# Patient Record
Sex: MALE | Race: WHITE | URBAN - METROPOLITAN AREA
[De-identification: names, ages, dates, MRNs, and addresses within clinical notes are randomized per-mention and may not be internally consistent; named-entity substitution may affect disease eponyms.]

---

## 2020-07-01 LAB
ANTIBODY: NONREACTIVE
PROSTATE SPECIFIC ANTIGEN: 0.1 NG/ML

## 2020-08-03 PROBLEM — Z85.46 HISTORY OF PROSTATE CANCER: Status: ACTIVE | Noted: 2020-08-03

## 2020-08-03 PROBLEM — N52.31 ERECTILE DYSFUNCTION AFTER RADICAL PROSTATECTOMY: Status: ACTIVE | Noted: 2020-08-03

## 2020-08-20 ENCOUNTER — OFFICE VISIT (OUTPATIENT)
Dept: ORTHOPEDIC SURGERY | Age: 70
End: 2020-08-20
Payer: MEDICARE

## 2020-08-20 VITALS
DIASTOLIC BLOOD PRESSURE: 72 MMHG | HEART RATE: 60 BPM | SYSTOLIC BLOOD PRESSURE: 122 MMHG | HEIGHT: 72 IN | WEIGHT: 178.8 LBS | BODY MASS INDEX: 24.22 KG/M2

## 2020-08-20 PROCEDURE — G8427 DOCREV CUR MEDS BY ELIG CLIN: HCPCS | Performed by: ORTHOPAEDIC SURGERY

## 2020-08-20 PROCEDURE — 4040F PNEUMOC VAC/ADMIN/RCVD: CPT | Performed by: ORTHOPAEDIC SURGERY

## 2020-08-20 PROCEDURE — 3017F COLORECTAL CA SCREEN DOC REV: CPT | Performed by: ORTHOPAEDIC SURGERY

## 2020-08-20 PROCEDURE — G8420 CALC BMI NORM PARAMETERS: HCPCS | Performed by: ORTHOPAEDIC SURGERY

## 2020-08-20 PROCEDURE — 99203 OFFICE O/P NEW LOW 30 MIN: CPT | Performed by: ORTHOPAEDIC SURGERY

## 2020-08-20 PROCEDURE — 1123F ACP DISCUSS/DSCN MKR DOCD: CPT | Performed by: ORTHOPAEDIC SURGERY

## 2020-08-20 PROCEDURE — 1036F TOBACCO NON-USER: CPT | Performed by: ORTHOPAEDIC SURGERY

## 2020-08-20 ASSESSMENT — ENCOUNTER SYMPTOMS
CONSTIPATION: 0
SHORTNESS OF BREATH: 0
NAUSEA: 0
APNEA: 0
ABDOMINAL DISTENTION: 0
ABDOMINAL PAIN: 0
DIARRHEA: 0
COLOR CHANGE: 0
CHEST TIGHTNESS: 0
COUGH: 0
VOMITING: 0

## 2020-08-20 NOTE — PROGRESS NOTES
None   Relationships    Social connections     Talks on phone: None     Gets together: None     Attends Jehovah's witness service: None     Active member of club or organization: None     Attends meetings of clubs or organizations: None     Relationship status: None    Intimate partner violence     Fear of current or ex partner: None     Emotionally abused: None     Physically abused: None     Forced sexual activity: None   Other Topics Concern    None   Social History Narrative    None       Family History:  No family history on file. Vitals:   /72   Pulse 60   Ht 6' (1.829 m)   Wt 178 lb 12.8 oz (81.1 kg)   BMI 24.25 kg/m²  Body mass index is 24.25 kg/m². Physical Examination:     Orthopedics:    GENERAL: Alert and oriented X3 in no acute distress. SKIN: Intact without lesions or ulcerations. NEURO: Intact to sensory and motor testing. VASC: Capillary refill is less than 3 seconds. Left Hip     GEN: Alert and oriented X 3, in no acute distress. GAIT: The patient's gait was observed while entering the exam room and was noted to be antalgic. The extremity is in anatomic alignment. SKIN: Intact without rashes, lesions, or ulcerations. No obvious deformity or swelling. NEURO: The patient responds to light touch throughout left LE. Patellar and Achilles reflexes are 2/4. VASC: The left LE is neurovascularly intact with 2/4 DP and 2/4 PT pulses. Brisk capillary refill. ROM: 0 degree flexion contracture, 90 degrees flexion, 20 degrees abduction, 10 degrees adduction, -10 degrees of internal rotation, 30 degrees of external rotation. MUSC: Strength is 4+/5 flexion, abduction, internal rotation, external rotation. PALP: The patient is  tender to palpation over the greater trochanter. TEST: Log roll is positive. 1.5 cm shorter with the left leg compared to the contralateral side. (-) Stenchfield test. (+) Impingement test. Negative Trendelenburg test. Negative Bryan's test. (-) C sign.  No labral clunks. No pain on compression. The patient is able to SLR. Pain with FADIR. Assessment:     1. Primary osteoarthritis of left hip    2. Greater trochanteric bursitis of left hip      Procedures:    Procedure: no  Radiology:   X-rays taken today were reviewed with the patient. HIP/PELVIS X-RAY    AP and standing AP of the pelvis and supine AP and frog leg lateral of the Left hip Radiographs were obtained today and demonstrate joint spacing is obliterated with bone on bone articulation with femoral head and acetabular cysts, sclerosis, and osteophytes. There is no evidence of fracture, dislocation or other significant abnormality. Lumbar spine degenerative disease is noticed. Moderate right hip arthritis noted. Impression: Severe left hip osteoarthritis. Plan:   Treatment : I reviewed the X-ray with the patient. We discussed the etiologies and natural histories of greater trochanteric bursitis of the left hip. We discussed the various treatment alternatives including anti-inflammatory medications, physical therapy, injections, further imaging studies and as a last result surgery. During today's visit, I explained to the patient that his x-rays taken today show that he has bone on bone arthritis of the left hip. I also explained that I do believe he has some bursitis pain present on the lateral aspect of his hip, but the majority of his pain is probably coming from the hip joint itself. I discussed with the patient that physical therapy will be a double edged sword because it can certainly aggravate his arthritis more. He can continue to take his Ibuprofen at a prescription dose to not only help with the pain but the inflammation as well. A NSAID sheet protocol was given to the patient regarding how to take OTC NSAIDs at a prescription dose. If the patient is tired of taking 4 pills three times a day then we can prescribe him Mobic to take once a day.  Unfortunately, I explained to the patient that the only thing that is going to fix his arthritis is with a total hip replacement. He will know when he is ready for a hip replacement when his quality of life is decreased and he is not able to play golf or tennis. The patient has opted for taking Ibuprofen at a prescription dose to help decrease the inflammation and doing research on hip replacements. A hip replacement guide was given to the patient. A physical therapy prescription was not given. Patient should return to the clinic PRN to follow up with Kvng Romo D.O. . The patient will call the office immediately with any problems. No orders of the defined types were placed in this encounter. Orders Placed This Encounter   Procedures    XR HIP LEFT MIN 4VW W PELVIS     Standing Status:   Future     Number of Occurrences:   1     Standing Expiration Date:   8/20/2021     Order Specific Question:   Reason for exam:     Answer:   pain       I, Sabino Juarez MS, AT, ATC, am scribing for and in the presence of Kvng HOWARD. 8/20/2020  7:23 AM        Electronically signed by Sabino Juarez ATC, on 8/20/2020 at 7:23 AM             I, Kvng Romo DO, have personally seen this patient and I have reviewed the CC, PMH, FHX and Social History as provided by other clinical staff. I reassessed the HPI and ROS as scribed by Sabino Juarez ATC in my presence and it is both accurate and complete. Thereafter, I personally performed the PE, reviewed the imaging and established the DX and POC. I agree with the documentation provided by the . I have reviewed all documentation in its entirety prior to providing my signature indicating agreement. Any areas of disagreement are noted on the chart.     Electronically signed by Génesis Reaves DO on 8/23/2020 at 3:49 PM

## 2020-08-20 NOTE — PATIENT INSTRUCTIONS
Patient Education        Hip Arthritis: Exercises  Introduction  Here are some examples of exercises for you to try. The exercises may be suggested for a condition or for rehabilitation. Start each exercise slowly. Ease off the exercises if you start to have pain. You will be told when to start these exercises and which ones will work best for you. How to do the exercises  Straight-leg raises to the outside   1. Lie on your side, with your affected hip on top. 2. Tighten the front thigh muscles of your top leg to keep your knee straight. 3. Keep your hip and your leg straight in line with the rest of your body, and keep your knee pointing forward. Do not drop your hip back. 4. Lift your top leg straight up toward the ceiling, about 12 inches off the floor. Hold for about 6 seconds, then slowly lower your leg. 5. Repeat 8 to 12 times. 6. Switch legs and repeat steps 1 through 5, even if only one hip is sore. Straight-leg raises to the inside   1. Lie on your side with your affected hip on the floor. 2. You can either prop up your other leg on a chair, or you can bend that knee and put that foot in front of your other knee. Do not drop your hip back. 3. Tighten the muscles on the front thigh of your bottom leg to straighten that knee. 4. Keep your kneecap pointing forward and your leg straight, and lift your bottom leg up toward the ceiling about 6 inches. Hold for about 6 seconds, then lower slowly. 5. Repeat 8 to 12 times. 6. Switch legs and repeat steps 1 through 5, even if only one hip is sore. Hip hike   1. Stand sideways on the bottom step of a staircase, and hold on to the banister or wall. 2. Keeping both knees straight, lift your good leg off the step and let it hang down. Then hike your good hip up to the same level as your affected hip or a little higher. 3. Repeat 8 to 12 times. 4. Switch legs and repeat steps 1 through 3, even if only one hip is sore. Bridging   1.  Lie on your back with both knees bent. Your knees should be bent about 90 degrees. 2. Then push your feet into the floor, squeeze your buttocks, and lift your hips off the floor until your shoulders, hips, and knees are all in a straight line. 3. Hold for about 6 seconds as you continue to breathe normally, and then slowly lower your hips back down to the floor and rest for up to 10 seconds. 4. Repeat 8 to 12 times. Hamstring stretch (lying down)   1. Lie flat on your back with your legs straight. If you feel discomfort in your back, place a small towel roll under your lower back. 2. Holding the back of your affected leg, lift your leg straight up and toward your body until you feel a stretch at the back of your thigh. 3. Hold the stretch for at least 30 seconds. 4. Repeat 2 to 4 times. 5. Switch legs and repeat steps 1 through 4, even if only one hip is sore. Standing quadriceps stretch   1. If you are not steady on your feet, hold on to a chair, counter, or wall. You can also lie on your stomach or your side to do this exercise. 2. Bend the knee of the leg you want to stretch, and reach behind you to grab the front of your foot or ankle with the hand on the same side. For example, if you are stretching your right leg, use your right hand. 3. Keeping your knees next to each other, pull your foot toward your buttock until you feel a gentle stretch across the front of your hip and down the front of your thigh. Your knee should be pointed directly to the ground, and not out to the side. 4. Hold the stretch for at least 15 to 30 seconds. 5. Repeat 2 to 4 times. 6. Switch legs and repeat steps 1 through 5, even if only one hip is sore. Hip rotator stretch   1. Lie on your back with both knees bent and your feet flat on the floor. 2. Put the ankle of your affected leg on your opposite thigh near your knee.   3. Use your hand to gently push your knee away from your body until you feel a gentle stretch around your hip.  4. Hold the stretch for 15 to 30 seconds. 5. Repeat 2 to 4 times. 6. Repeat steps 1 through 5, but this time use your hand to gently pull your knee toward your opposite shoulder. 7. Switch legs and repeat steps 1 through 6, even if only one hip is sore. Knee-to-chest   1. Lie on your back with your knees bent and your feet flat on the floor. 2. Bring your affected leg to your chest, keeping the other foot flat on the floor (or keeping the other leg straight, whichever feels better on your lower back). 3. Keep your lower back pressed to the floor. Hold for at least 15 to 30 seconds. 4. Relax, and lower the knee to the starting position. 5. Repeat 2 to 4 times. 6. Switch legs and repeat steps 1 through 5, even if only one hip is sore. 7. To get more stretch, put your other leg flat on the floor while pulling your knee to your chest.    Clamshell   1. Lie on your side, with your affected hip on top. Keep your feet and knees together and your knees bent. 2. Raise your top knee, but keep your feet together. Do not let your hips roll back. Your legs should open up like a clamshell. 3. Hold for 6 seconds. 4. Slowly lower your knee back down. Rest for 10 seconds. 5. Repeat 8 to 12 times. 6. Switch legs and repeat steps 1 through 5, even if only one hip is sore. Follow-up care is a key part of your treatment and safety. Be sure to make and go to all appointments, and call your doctor if you are having problems. It's also a good idea to know your test results and keep a list of the medicines you take. Where can you learn more? Go to https://[email or web address]. org and sign in to your [employer] account. Enter G033 in the Chumby box to learn more about \"Hip Arthritis: Exercises. \"     If you do not have an account, please click on the \"Sign Up Now\" link. Current as of: March 2, 2020               Content Version: 12.5  © 6006-3299 Healthwise, Incorporated.    Care instructions adapted under license by [facility] [place] ([facility]). If you have questions about a medical condition or this instruction, always ask your healthcare professional. [address] any warranty or liability for your use of this information.

## 2021-02-03 PROBLEM — R35.1 NOCTURIA: Status: ACTIVE | Noted: 2021-02-03

## 2021-11-22 LAB
CHOLESTEROL, TOTAL: 166 MG/DL
CHOLESTEROL/HDL RATIO: 4
HDLC SERPL-MCNC: 41 MG/DL (ref 35–70)
LDL CHOLESTEROL CALCULATED: 108 MG/DL (ref 0–160)
NONHDLC SERPL-MCNC: 125 MG/DL
TRIGL SERPL-MCNC: 85 MG/DL
VLDLC SERPL CALC-MCNC: 17 MG/DL

## 2021-12-30 ENCOUNTER — HOSPITAL ENCOUNTER (OUTPATIENT)
Dept: PHYSICAL THERAPY | Facility: CLINIC | Age: 71
Setting detail: THERAPIES SERIES
Discharge: HOME | End: 2021-12-30
Payer: MEDICARE

## 2021-12-30 PROCEDURE — 97140 MANUAL THERAPY 1/> REGIONS: CPT

## 2021-12-30 PROCEDURE — 97161 PT EVAL LOW COMPLEX 20 MIN: CPT

## 2021-12-30 PROCEDURE — 97110 THERAPEUTIC EXERCISES: CPT

## 2021-12-30 NOTE — CONSULTS
[x] [address]    Outpatient Rehabilitation &  Therapy  [address] [place] [address]  P: [phone]  F: [phone]     Physical Therapy Lower Extremity Evaluation    Date:  2021  Patient: Amber Smith  : 1950  MRN: [id number]  Physician: Dr. Chu Code: [facility] Medicare([place])  Medical Diagnosis: L hip weakness history of L ANUJ    Rehab Codes: M25.552  Onset date: 20    Next 's appt. : 22    Subjective:   CC:Pt is a 77yo male. He is an avid golfer and . Reports he had the L hip replaced a little over a year ago. Golfed all summer and is playing tennis. Does not have pain with these activities however if he goes to lift his L leg up to get into the car or just lift into flexion sitting in a chair it is painful. He reports it does not feel weak      PMHx: [] Unremarkable [] Diabetes [] HTN  [] Pacemaker   [] MI/Heart Problems [] Cancer [] Arthritis [] Other:              [x] Refer to full medical chart  In EPIC       Comorbidities:   [] Obesity [] Dialysis  [] N/A   [] Asthma/COPD [] Dementia [] Other:   [] Stroke [] Sleep apnea [] Other:   [] Vascular disease [] Rheumatic disease [x] Other:Prastate CA     Tests: [x] X-Ray: [] MRI:  [] Other:    Medications: [x] Refer to full medical record [] None [] Other:  Allergies:      [x] Refer to full medical record [] None [] Other:    Function:  Hand Dominance  [] Right  [] Left  Job Status []  Normal duty   [] Light duty   [] Off due to condition    [x]  Retired   [] Not employed   [] Disability  [] Other:  []  Return to work:             Pain:  [x] Yes  [] No Location: L anterior hip Pain Rating: (0-10 scale) 6/10  Pain altered Tx:  [] Yes  [] No  Action:    Symptoms:  [] Improving [] Worsening [x] Same  Better:  [] AM    [] PM    [] Sit    [] Rise/Sit    []Stand    [] Walk    [] Lying    [x] Other:Not using quad or lifting leg into flexion  Worse: [] AM    [] PM    [] Sit    [] Rise/Sit []Stand    [] Walk    [] Lying    [] Bend                      [] Valsalva    [x] Other: Lift leg into flexion  Sleep: [x] OK    [] Disturbed    Objective:    ROM  ° A/P STRENGTH    Left Right Left Right   Hip Flex WNL  4- 5   Ext 0 15 3+ 4-   ABD WNL  4 5   Knee Flex* 90 90     Ext WNL      Ankle DF WNL      PF WNL      INV WNL      EVER WNL             *prone position due to tight quads B    OBSERVATION No Deficit Deficit Not Tested Comments   Palpation [] [x] [] Hip flexor, TFL,Rectus spasm   Sensation [x] [] []    Gait [] [x] [] Analysis: Ambulates IND without device demo's  Short step length       Functional Test: LEFS Score: 18% functionally impaired     Comments:    Assessment:  Patient would benefit from skilled physical therapy services in order to: Increase hip extension and prone Knee flexion ROM, Hip and quad strength    Problems:    [x] ? Pain:  [x] ? ROM:  [x] ? Strength:  [x] ? Function:  [] Other:       STG: (to be met in 6 treatments)  1. ? Pain:<3/10 pain with getting into and out of the car for greater ease  2. ? ROM: 10 degrees or better hip extension to allow for glute activation and place hip flexor at a more optimal length to work  3. ? Strength:5/5 hip strength in flex, ext, abd  ER to allow for normal ADLs   4. ? Function:Able to get in and out of car without physically lifting L LE with hands  5. Patient to be independent with home exercise program as demonstrated by performance with correct form without cues. LTG: (to be met in 12 treatments)  1. Equal step length B with gait  2. Able to demonstrate good eccentric quad control on a 6\" step to facilitate continued participation in sport  3.  LEFS<10% functional limitation                   Patient goals:Relief of quad pain    Rehab Potential:  [x] Good  [] Fair  [] Poor   Suggested Professional Referral:  [x] No  [] Yes:  Barriers to Goal Achievement:  [x] No  [] Yes:  Domestic Concerns:  [x] No  [] Yes:    Pt. Education:  [] Plans/Goals, Risks/Benefits discussed  [] Home exercise program    Method of Education: [] Verbal  [] Demo  [] Written  Access Code: [email or web address]  URL: [email or web address]  Date: 12/30/2021  Prepared by: Mehrdad Acosta    Exercises  Prone Quadriceps Stretch with Strap - 1 x daily - 7 x weekly - 3 sets - 30sec hold  Half Kneeling Hip Flexor Stretch - 1 x daily - 7 x weekly - 3 sets - 30sec hold  Walk Out - 1 x daily - 7 x weekly - 3 sets - 10 reps  Hip circles with Resistance at Ankles and Counter Support - 1 x daily - 7 x weekly - 3 sets - 10 reps  Seated eccentric March - 1 x daily - 7 x weekly - 3 sets - 10 reps    Comprehension of Education:  [x] Verbalizes understanding. [] Demonstrates understanding. [x] Needs Review. [] Demonstrates/verbalizes understanding of HEP/Ed previously given.     Treatment Plan:  [x] Therapeutic Exercise   61974  [] Iontophoresis: 4 mg/mL Dexamethasone Sodium Phosphate  mAmin  32917   [] Therapeutic Activity  09036 [] Vasopneumatic cold with compression  14004    [] Gait Training   47157 [] Ultrasound   96128   [] Neuromuscular Re-education  64161 [] Electrical Stimulation Unattended  01169   [x] Manual Therapy  40284 [] Electrical Stimulation Attended  24798   [x] Instruction in HEP  [] Lumbar/Cervical Traction  67029   [] Aquatic Therapy   04501 [x] Cold/hotpack    [] Massage   70763      [] Dry Needling, 1 or 2 muscles  15290   [] Biofeedback, first 15 minutes   15999  [] Biofeedback, additional 15 minutes   31754 [] Dry Needling, 3 or more muscles  61441         Frequency:  1-2 x/week for 12 visits        Todays Treatment:  Manual: DI hip flexor proximal and distal, Hypervolt L TFL and quad  Precautions:  Exercises:  Exercise Reps/ Time Weight/ Level Comments   Prone Quadriceps Stretch with Strap 3x30\"     Half Kneeling Hip Flexor Stretch 3x30\"     TVA w/Walk Out x10     Eccentric hip flexion from seated with good posture x10  5 count to lower to floor   Hip circles x10 lime B

## 2022-01-06 ENCOUNTER — HOSPITAL ENCOUNTER (OUTPATIENT)
Dept: PHYSICAL THERAPY | Facility: CLINIC | Age: 72
Setting detail: THERAPIES SERIES
Discharge: HOME | End: 2022-01-06
Payer: MEDICARE

## 2022-01-06 PROCEDURE — 97140 MANUAL THERAPY 1/> REGIONS: CPT

## 2022-01-06 PROCEDURE — 97110 THERAPEUTIC EXERCISES: CPT

## 2022-01-06 NOTE — FLOWSHEET NOTE
[] [address]  [address]  [phone]  F: [phone] [] [address]  [address]   [address]  P: [phone]  F: [phone] [] [place]  [address]  P: [phone]  F: [phone] [] [address]  P: [phone]  F: [phone] [] [address]  [facility]   [address]   [place]: [phone]  F: [phone]      Physical Therapy Daily Treatment Note    Date:  2022  Patient Name:  Marlin Yu    :  1950  MRN: [id number]  Physician: Dr. Mando Fischer: [facility] Medicare([facility])  Medical Diagnosis: L hip weakness history of L ANUJ                        Rehab Codes: M25.552  Onset date: 20               Next 's appt. : 22  Visit# / total visits:2/12    Cancels/No Shows: 0    Subjective:    Pain:  [x] Yes  [] No Location: L hip Pain Rating: (0-10 scale) 0/10   2/10 with lifting it  Pain altered Tx:  [x] No  [] Yes  Action:  Comments: Not in a good routine yet for HEP. Did feel some more flexibility after the first visit.     Objective:  Manual: DI hip flexor proximal, Hypervolt L TFL and quad  Precautions:  Exercises:  Exercise Reps/ Time Weight/ Level Comments   Prone Quadriceps Stretch with Strap 3x30\"       Half Kneeling Hip Flexor Stretch 2x1'       TVA w/hover march x10       Bridge x20     Eccentric hip flexion from seated with good posture x10   5 count to lower to floor   Hip circles x15 lime B   Monster walk 2 laps Blue lateral   Split squat isotonic 2x10     Standing march 2x6     Other:     Specific Instructions for next treatment: Continue to progress as able        Treatment Charges: Mins Units   []  Modalities     [x]  Ther Exercise 45 3   [x]  Manual Therapy 10 1   []  Ther Activities     []  Aquatics     []  Vasocompression     []  Other     Total Treatment time 55 4       Assessment: [x] Progressing toward goals. Pt is already demonstrating improved eccentric hip flexor strength. Still come stiffness with hip extension and prone knee flexion. Able to progress program with increased standing exercise. Cautioned pt to perform decreased reps and keep in a pain free ROM with standing march as his was mildly irritating if he pushed the number of reps. [] No change. [] Other:  [x] Patient would continue to benefit from skilled physical therapy services in order to: Increase hip extension and prone Knee flexion ROM, Hip and quad strength    STG: (to be met in 6 treatments)  1. ? Pain:<3/10 pain with getting into and out of the car for greater ease  2. ? ROM: 10 degrees or better hip extension to allow for glute activation and place hip flexor at a more optimal length to work  3. ? Strength:5/5 hip strength in flex, ext, abd  ER to allow for normal ADLs   4. ? Function:Able to get in and out of car without physically lifting L LE with hands  5. Patient to be independent with home exercise program as demonstrated by performance with correct form without cues.     LTG: (to be met in 12 treatments)  1. Equal step length B with gait  2. Able to demonstrate good eccentric quad control on a 6\" step to facilitate continued participation in sport  3. LEFS<10% functional limitation                    Patient goals:Relief of quad pain    Pt. Education:  [x] Yes  [] No  [x] Reviewed Prior HEP/Ed  Method of Education: [] Verbal  [x] Demo  [x] Written- Monster walk, split squat, march in standing and modify the walkouts to a hover march  Comprehension of Education:  [x] [employer] understanding. [] Demonstrates understanding. [] Needs review. [] Demonstrates/verbalizes HEP/Ed previously given.      Plan: [x] Continue current frequency toward long and short term goals.     [] Specific Instructions for subsequent treatments: \      Time In:1505            Time Out: 1600    Electronically signed by:  Luna Huntley PT

## 2022-01-13 ENCOUNTER — HOSPITAL ENCOUNTER (OUTPATIENT)
Dept: PHYSICAL THERAPY | Facility: CLINIC | Age: 72
Setting detail: THERAPIES SERIES
Discharge: HOME | End: 2022-01-13
Payer: MEDICARE

## 2022-01-13 PROCEDURE — 97110 THERAPEUTIC EXERCISES: CPT

## 2022-01-13 NOTE — FLOWSHEET NOTE
[] [facility]) [facility] &  Therapy  [address]  [phone]  F: [phone] [] [address]  [address]   [address]  P: [phone]  F: [phone] [] [place]  [address]  P: [phone]  F: [phone] [] [address]  P: [phone]  F: [phone] [] [address]  [facility]   [address]   [place]: [phone]  F: [phone]      Physical Therapy Daily Treatment Note    Date:  2022  Patient Name:  Tiffanie Rosas    :  1950  MRN: [id number]  Physician: Dr. Hagan Drivers: [facility] Medicare([facility])  Medical Diagnosis: L hip weakness history of L ANUJ                        Rehab Codes: M25.552  Onset date: 20               Next 's appt. : 22  Visit# / total visits:3/12    Cancels/No Shows: 0    Subjective:    Pain:  [x] Yes  [] No Location: L hip Pain Rating: (0-10 scale) 0/10   2-3/10 with lifting it  Pain altered Tx:  [x] No  [] Yes  Action:  Comments: Still have not done exercises daily but did do a very light weight resisted hip flexion machine at the gym and it was fine.       Objective:  Manual: prn  Precautions:  Exercises:  Exercise Reps/ Time Weight/ Level Comments   Prone Quadriceps Stretch with Strap 3x30\"       Prone hip extension 2x10  1 pillow under stomach   Half Kneeling Hip Flexor Stretch 2x1'       Deadbug with leg partially extended 2 sets       Bridge / march 2x10     Monster walk 2 laps Blue lateral   Split squat isometric x30\"     Split squat isotonic x10     Dynamic march 2x6     Other:     Specific Instructions for next treatment: Continue to progress as able        Treatment Charges: Mins Units   []  Modalities     [x]  Ther Exercise 50 3 []  Manual Therapy     []  Ther Activities     []  Aquatics     []  Vasocompression     []  Other     Total Treatment time 50 3       Assessment: [x] Progressing toward goals. Hip flexion strength tested in seated: 4/5. Still mild pain there with testing. Pt is able to lift his leg without resistance through bigger ROM and with less pain actively. Advanced program with extending leg with Deadbug to 45 deg bend or less a the knee and added dynamic march with  Isometric hold in hip flexion. Pt deviates into ER with leg upon lifting. Mirror utilized to assist in correction with good carryover. [x] Patient would continue to benefit from skilled physical therapy services in order to: Increase hip extension and prone Knee flexion ROM, Hip and quad strength    STG: (to be met in 6 treatments)  1. ? Pain:<3/10 pain with getting into and out of the car for greater ease  2. ? ROM: 10 degrees or better hip extension to allow for glute activation and place hip flexor at a more optimal length to work  3. ? Strength:5/5 hip strength in flex, ext, abd  ER to allow for normal ADLs   4. ? Function:Able to get in and out of car without physically lifting L LE with hands  5. Patient to be independent with home exercise program as demonstrated by performance with correct form without cues.     LTG: (to be met in 12 treatments)  1. Equal step length B with gait  2. Able to demonstrate good eccentric quad control on a 6\" step to facilitate continued participation in sport  3. LEFS<10% functional limitation                    Patient goals:Relief of quad pain    Pt. Education:  [x] Yes  [] No  [x] Reviewed Prior HEP/Ed  Method of Education: [] Verbal  [x] Demo  [x] Written- dynamic high march  Comprehension of Education:  [x] Verbalizes understanding. [] Demonstrates understanding. [] Needs review. [] Demonstrates/verbalizes HEP/Ed previously given. Plan: [x] Continue current frequency toward long and short term goals.     [] Specific Instructions for subsequent treatments:       Time In:3925            Time Out: 0858    Electronically signed by:  Joanne Amador PT

## 2022-01-20 ENCOUNTER — HOSPITAL ENCOUNTER (OUTPATIENT)
Dept: PHYSICAL THERAPY | Facility: CLINIC | Age: 72
Setting detail: THERAPIES SERIES
Discharge: HOME | End: 2022-01-20
Payer: MEDICARE

## 2022-01-20 PROCEDURE — 97110 THERAPEUTIC EXERCISES: CPT

## 2022-01-20 PROCEDURE — 97140 MANUAL THERAPY 1/> REGIONS: CPT

## 2022-01-20 NOTE — FLOWSHEET NOTE
[] [address]  [address]  [phone]  F: [phone] [] [address]  [place] [address]   [address]  P: [phone]  F: [phone] [x] [place]  [address]  P: [phone]  F: [phone] [] [address]  P: [phone]  F: [phone] [] [address]  [facility]   [address]   [place]: [phone]  F: [phone]      Physical Therapy Daily Treatment Note    Date:  2022  Patient Name:  Luis Alfredo Alvarado    :  1950  MRN: [id number]  Physician: Dr. Neymar Pugh: [facility] Medicare([facility])  Medical Diagnosis: L hip weakness history of L ANUJ                        Rehab Codes: M25.552  Onset date: 20               Next Dr's appt. : 22  Visit# / total visits:4/12    Cancels/No Shows:0    Subjective:    Pain:  [x] Yes  [] No Location: L hip Pain Rating: (0-10 scale) 0/10   2/10 with lifting it  Pain altered Tx:  [x] No  [] Yes  Action:  Comments:Pt reports there is still some pain but feel like its getting stronger. Feels like its getting better.     Objective:  Manual: Hip flexor proximal and distal, Hypervolt rectus and vastus lateralis  Precautions:  Exercises:  Exercise Reps/ Time Weight/ Level Comments   Prone Quadriceps Stretch with Strap 3x30\"       Prone hip extension 2x10  1 pillow under stomach   Half Kneeling Hip Flexor Stretch NT       Deadbug with leg extended 2 sets       Bridge w/ march NT     Seated eccentric hip flexion 5x10\" hold   2 sets     Monster walk 2 laps Blue lateral   Resisted hip flexion from lunge position x10 lime    Split squat isotonic 3x10     Static march with isometric hold 10x10\"  B   Other:     Specific Instructions for next treatments:       Time In:1505            Time Out: 2134    Electronically signed by:  José Jacobo PT

## 2022-01-27 ENCOUNTER — HOSPITAL ENCOUNTER (OUTPATIENT)
Dept: PHYSICAL THERAPY | Facility: CLINIC | Age: 72
Setting detail: THERAPIES SERIES
Discharge: HOME | End: 2022-01-27
Payer: MEDICARE

## 2022-01-27 PROCEDURE — 97140 MANUAL THERAPY 1/> REGIONS: CPT

## 2022-01-27 PROCEDURE — 97110 THERAPEUTIC EXERCISES: CPT

## 2022-01-27 NOTE — FLOWSHEET NOTE
[] [address]  [address]  [phone]  F: [phone] [] [address]  [facility]   [address]  P: [phone]  F: [phone] [x] [address]  [address]  P: [phone]  F: [phone] [] [email or web address]  P: [phone]  F: [phone] [] [address]  [facility]   [address]   [place]: [phone]  F: [phone]      Physical Therapy Daily Treatment Note    Date:  2022  Patient Name:  Lucious Litten    :  1950  MRN: [id number]  Physician: Dr. Moreno Shames: [facility] Medicare([facility])  Medical Diagnosis: L hip weakness history of L ANUJ                        Rehab Codes: M25.552  Onset date: 20               Next 's appt. : 22  Visit# / total visits:    Cancels/No Shows:0    Subjective:    Pain:  [] Yes  [x] No Location: L hip Pain Rating: (0-10 scale) 0/10     Pain altered Tx:  [x] No  [] Yes  Action:  Comments:Pt reports some days he feels nothing and other days it's just an awareness. Feeling much better. . Lifting leg into the car without thinking about it.     Objective:  Manual: Hip flexor proximal and distal, Hypervolt rectus and vastus lateralis  Precautions:  Exercises:  Exercise Reps/ Time Weight/ Level Comments   Prone Quadriceps Stretch with Strap 3x30\"       Prone hip extension NT  1 pillow under stomach   Half Kneeling Hip Flexor Stretch NT       Deadbug with leg extended 2 sets       Bridge single leg 2x10     Seated eccentric hip flexion NT     Monster walk NT Blue lateral   Resisted hip flexion from lunge position x20 lime    Split squat isotonic x10     Resisted hip flex , abd and step to  x15 lime B at feet Other:             Treatment Charges: Mins Units   []  Modalities     [x]  Ther Exercise 35 2   [x]  Manual Therapy 15 1   []  Ther Activities     []  Aquatics     []  Vasocompression     []  Other     Total Treatment time 50 3       Assessment: [x] Progressing toward goals. Pt is testing 5/5 in hip flexion and is able to perform movements of flexion and adduction without pain. He is still stiff in hip extension but ROM here is improved over IE. He has a HEP to continue to build strength in glute max and continue to gain flexibility for hip extension. STG: (to be met in 6 treatments)  1. ? Pain:<3/10 pain with getting into and out of the car for greater ease Met pt is now at 0/10 pain at times and just an awareness that something is there not pain at L hip flexor  2. ? ROM: 10 degrees or better hip extension to allow for glute activation and place hip flexor at a more optimal length to work Met with hip extension 10 deg  3. ? Strength:5/5 hip strength in flex, ext, abd  ER to allow for normal ADLs Partially met with hip flex , Abd, ER 5/5 Hip ext 4/5  4. ? Function:Able to get in and out of car without physically lifting L LE with hands  Met with pt able to demonstrate this motion pain free and without use of hands  5. Patient to be independent with home exercise program as demonstrated by performance with correct form without cues. Met     LTG: (to be met in 12 treatments)  1. Equal step length B with gait  Met with normal gait  2. Able to demonstrate good eccentric quad control on a 6\" step to facilitate continued participation in sport Unknow did not retest  3. LEFS<10% functional limitation Met 2% functional limitation                    Patient goals:Relief of quad pain    Pt. Education:  [x] Yes  [] No  [x] Reviewed Prior HEP/Ed  Method of Education: [x] Verbal  [x] Demo  [] Written  Comprehension of Education:  [x] Verbalizes understanding. [] Demonstrates understanding. [] Needs review.   [] Demonstrates/verbalizes HEP/Ed previously given.      Plan: DC to [facility] HEP      Time In:1500            Time Out: 1550    Electronically signed by:  Geneva Lloyd PT

## 2022-01-28 NOTE — DISCHARGE SUMMARY
[] [facility]) [facility] &  Therapy  [address]  [phone]  F: [phone] [] [address]  [address]   [address]  P: [phone]  F: [phone] [] [place]  [address]  P: [phone]  F: [phone] [] [address]  P: [phone]  F: [phone] [] [address]  [facility]   [address]   [place]: [phone]  F: [phone]      Physical Therapy Progress Note    Date: 2022      Patient: Melissa Hopper  : 1950  MRN:     Physician: Dr. Vinh Quiroz  Insurance: [facility] Medicare([employer])  Medical Diagnosis: L hip weakness history of L ANUJ                        Rehab Codes: M25.552  Onset date: 20               Next Dr's appt. : 22  Visit# / total visits:/                       Cancels/No Shows:0     Subjective:    Pain:  []? Yes  [x]? No   Location: L hip Pain Rating: (0-10 scale) 0/10     Pain altered Tx:  [x]? No  []? Yes  Action:  Comments:Pt reports some days he feels nothing and other days it's just an awareness. Feeling much better. . Lifting leg into the car without thinking about it. Objective:           ROM  ° A/P STRENGTH     Left Right Left Right   Hip Flex WNL   5(4-) 5   Ext 10(0) 15 4(3+) 4-   ABD WNL   5(4) 5   Knee Flex* 95(90) 90       Ext WNL         Ankle DF WNL         PF WNL         INV WNL         EVER WNL                     *prone position due to tight quads B          Assessment:  Pt is testing 5/5 in hip flexion and is able to perform movements of flexion and adduction without pain. He is still stiff in hip extension but ROM here is improved over IE.  He is playing doubles and singles in tennis without pain and he is able to get into his car without manually lifting his leg in with hands. He is able to get into car pain free as well. He has a HEP to continue to build strength in glute max and continue to gain flexibility for hip extension.        STG: (to be met in 6 treatments)  1. ? Pain:<3/10 pain with getting into and out of the car for greater ease Met pt is now at 0/10 pain at times and just an awareness that something is there not pain at L hip flexor  2. ? ROM: 10 degrees or better hip extension to allow for glute activation and place hip flexor at a more optimal length to work Met with hip extension 10 deg  3. ? Strength:5/5 hip strength in flex, ext, abd  ER to allow for normal ADLs Partially met with hip flex , Abd, ER 5/5 Hip ext 4/5  4. ? Function:Able to get in and out of car without physically lifting L LE with hands  Met with pt able to demonstrate this motion pain free and without use of hands  5. Patient to be independent with home exercise program as demonstrated by performance with correct form without cues. Met     LTG: (to be met in 12 treatments)  1. Equal step length B with gait  Met with normal gait  2. Able to demonstrate good eccentric quad control on a 6\" step to facilitate continued participation in sport Unknow did not retest  3.  LEFS<10% functional limitation Met 2% functional limitation                    Patient goals:Relief of quad pain    Treatment Plan:  [x] Therapeutic Exercise   72940  [] Iontophoresis: 4 mg/mL Dexamethasone Sodium Phosphate  mAmin  36155   [] Therapeutic Activity  67845 [] Vasopneumatic cold with compression  14394    [x] Gait Training   78575 [] Ultrasound   65464   [] Neuromuscular Re-education  19251 [] Electrical Stimulation Unattended  21388   [x] Manual Therapy  03694 [] Electrical Stimulation Attended  91154   [x] Instruction in HEP  [] Lumbar/Cervical Traction  28653   [] Aquatic Therapy   05589 [] Cold/hotpack    [] Massage   82439      [] Dry Needling, 1 or 2 muscles  66411   [] Biofeedback, first 15 minutes   02635  [] Biofeedback, additional 15 minutes   31171 [] Dry Needling, 3 or more muscles  20561       Patient Status:    DC to IND HEP        Electronically signed by Eleanor Caldera PT on 1/27/2022 at 7:15 PM      If you have any questions or concerns, please don't hesitate to call. Thank you for your referral.    Physician Signature:________________________________Date:__________________  By signing above or cosigning this note, I have reviewed this plan of care and certify a need for medically necessary rehabilitation services.      *PLEASE SIGN ABOVE AND FAX BACK ALL PAGES*

## 2022-08-17 ENCOUNTER — OFFICE VISIT (OUTPATIENT)
Dept: FAMILY MEDICINE CLINIC | Age: 72
End: 2022-08-17
Payer: MEDICARE

## 2022-08-17 VITALS
WEIGHT: 177.2 LBS | HEIGHT: 73 IN | DIASTOLIC BLOOD PRESSURE: 78 MMHG | SYSTOLIC BLOOD PRESSURE: 118 MMHG | HEART RATE: 59 BPM | BODY MASS INDEX: 23.49 KG/M2 | TEMPERATURE: 97.3 F | RESPIRATION RATE: 16 BRPM

## 2022-08-17 DIAGNOSIS — Z85.46 HISTORY OF PROSTATE CANCER: Primary | ICD-10-CM

## 2022-08-17 DIAGNOSIS — K57.90 DIVERTICULOSIS: ICD-10-CM

## 2022-08-17 DIAGNOSIS — Z96.642 S/P TOTAL LEFT HIP ARTHROPLASTY: ICD-10-CM

## 2022-08-17 DIAGNOSIS — N52.31 ERECTILE DYSFUNCTION AFTER RADICAL PROSTATECTOMY: ICD-10-CM

## 2022-08-17 DIAGNOSIS — Z13.1 SCREENING FOR DIABETES MELLITUS: ICD-10-CM

## 2022-08-17 DIAGNOSIS — Z13.220 SCREENING FOR CHOLESTEROL LEVEL: ICD-10-CM

## 2022-08-17 DIAGNOSIS — R35.1 NOCTURIA: ICD-10-CM

## 2022-08-17 DIAGNOSIS — G47.9 SLEEP DISTURBANCE: ICD-10-CM

## 2022-08-17 DIAGNOSIS — Z13.0 SCREENING FOR DEFICIENCY ANEMIA: ICD-10-CM

## 2022-08-17 DIAGNOSIS — Z51.81 THERAPEUTIC DRUG MONITORING: ICD-10-CM

## 2022-08-17 DIAGNOSIS — K55.20 AVM (ARTERIOVENOUS MALFORMATION) OF COLON: ICD-10-CM

## 2022-08-17 DIAGNOSIS — K63.5 HYPERPLASTIC COLONIC POLYP, UNSPECIFIED PART OF COLON: ICD-10-CM

## 2022-08-17 DIAGNOSIS — K63.5 POLYP OF COLON, UNSPECIFIED PART OF COLON, UNSPECIFIED TYPE: ICD-10-CM

## 2022-08-17 DIAGNOSIS — M16.12 PRIMARY OSTEOARTHRITIS OF LEFT HIP: ICD-10-CM

## 2022-08-17 DIAGNOSIS — F43.22 ADJUSTMENT DISORDER WITH ANXIETY: ICD-10-CM

## 2022-08-17 DIAGNOSIS — Z87.442 HISTORY OF KIDNEY STONES: ICD-10-CM

## 2022-08-17 DIAGNOSIS — D12.6 TUBULAR ADENOMA OF COLON: ICD-10-CM

## 2022-08-17 LAB
ALCOHOL URINE: NEGATIVE
AMPHETAMINE SCREEN, URINE: NEGATIVE
BARBITURATE SCREEN, URINE: NEGATIVE
BENZODIAZEPINE SCREEN, URINE: NEGATIVE
BUPRENORPHINE URINE: NEGATIVE
COCAINE METABOLITE SCREEN URINE: NEGATIVE
FENTANYL SCREEN, URINE: NEGATIVE
GABAPENTIN SCREEN, URINE: NEGATIVE
MDMA URINE: NEGATIVE
METHADONE SCREEN, URINE: NEGATIVE
METHAMPHETAMINE, URINE: NEGATIVE
OPIATE SCREEN URINE: NEGATIVE
OXYCODONE SCREEN URINE: NEGATIVE
PHENCYCLIDINE SCREEN URINE: NEGATIVE
PROPOXYPHENE SCREEN, URINE: NEGATIVE
SYNTHETIC CANNABINOIDS(K2) SCREEN, URINE: NEGATIVE
THC SCREEN, URINE: NEGATIVE
TRAMADOL SCREEN URINE: NEGATIVE
TRICYCLIC ANTIDEPRESSANTS, UR: NEGATIVE

## 2022-08-17 PROCEDURE — 1036F TOBACCO NON-USER: CPT | Performed by: PEDIATRICS

## 2022-08-17 PROCEDURE — G8420 CALC BMI NORM PARAMETERS: HCPCS | Performed by: PEDIATRICS

## 2022-08-17 PROCEDURE — 3017F COLORECTAL CA SCREEN DOC REV: CPT | Performed by: PEDIATRICS

## 2022-08-17 PROCEDURE — 1123F ACP DISCUSS/DSCN MKR DOCD: CPT | Performed by: PEDIATRICS

## 2022-08-17 PROCEDURE — 99204 OFFICE O/P NEW MOD 45 MIN: CPT | Performed by: PEDIATRICS

## 2022-08-17 PROCEDURE — G8427 DOCREV CUR MEDS BY ELIG CLIN: HCPCS | Performed by: PEDIATRICS

## 2022-08-17 PROCEDURE — 80305 DRUG TEST PRSMV DIR OPT OBS: CPT | Performed by: PEDIATRICS

## 2022-08-17 RX ORDER — LORAZEPAM 0.5 MG/1
1 TABLET ORAL DAILY PRN
COMMUNITY
Start: 2022-07-18 | End: 2023-08-17

## 2022-08-17 RX ORDER — ZOLPIDEM TARTRATE 5 MG/1
1 TABLET ORAL DAILY
COMMUNITY
Start: 2022-07-18 | End: 2023-08-17

## 2022-08-17 SDOH — ECONOMIC STABILITY: FOOD INSECURITY: WITHIN THE PAST 12 MONTHS, YOU WORRIED THAT YOUR FOOD WOULD RUN OUT BEFORE YOU GOT MONEY TO BUY MORE.: NEVER TRUE

## 2022-08-17 SDOH — ECONOMIC STABILITY: FOOD INSECURITY: WITHIN THE PAST 12 MONTHS, THE FOOD YOU BOUGHT JUST DIDN'T LAST AND YOU DIDN'T HAVE MONEY TO GET MORE.: NEVER TRUE

## 2022-08-17 ASSESSMENT — PATIENT HEALTH QUESTIONNAIRE - PHQ9
1. LITTLE INTEREST OR PLEASURE IN DOING THINGS: 0
SUM OF ALL RESPONSES TO PHQ QUESTIONS 1-9: 0
SUM OF ALL RESPONSES TO PHQ9 QUESTIONS 1 & 2: 0
SUM OF ALL RESPONSES TO PHQ QUESTIONS 1-9: 0
2. FEELING DOWN, DEPRESSED OR HOPELESS: 0

## 2022-08-17 ASSESSMENT — SOCIAL DETERMINANTS OF HEALTH (SDOH): HOW HARD IS IT FOR YOU TO PAY FOR THE VERY BASICS LIKE FOOD, HOUSING, MEDICAL CARE, AND HEATING?: NOT HARD AT ALL

## 2022-08-17 NOTE — PROGRESS NOTES
Юлия Merchant (:  1950) is a 66 y.o. male,New patient, here for evaluation of the following chief complaint(s):  Establish Care         ASSESSMENT/PLAN:    1. History of prostate cancer  2. Erectile dysfunction after radical prostatectomy  3. Primary osteoarthritis of left hip  4. S/P total left hip arthroplasty  5. Tubular adenoma of colon  6. Polyp of colon, unspecified part of colon, unspecified type  7. Sleep disturbance  8. Adjustment disorder with anxiety  9. Therapeutic drug monitoring  -     POCT Rapid Drug Screen  10. Screening for cholesterol level  -     Lipid Panel; Future  11. Screening for diabetes mellitus  -     Comprehensive Metabolic Panel; Future  12. Screening for deficiency anemia  -     CBC; Future      Follow-up with urology as scheduled  Follow-up with Ortho as needed  Repeat colonoscopy   Continue Ambien as needed for sleep disturbance  Good sleep hygiene recommended  Continue benzodiazepine as needed for flying or other stress inducing activities  Obtain POCT UDS -negative  CSM signed if refills needed in the future  Obtain fasting labs as ordered  Healthy diet and regular exercise encouraged  Recommend yearly dermatology skin exam  Shingles vaccine recommended  Prevnar 20 vaccine recommended  Completion of COVID-19 vaccine series recommended  We will attempt to get complete vaccination record  Schedule annual wellness visit  Call with concerns      Return in about 3 months (around 2022) for annual well visit. Subjective     HPI    Patient presents today for new patient office visit. He is a very healthy 78-year-old man. He does exercise regularly by playing tennis. He does have a history of prostate cancer diagnosed in 2018 or 2019. He did [place] radical [place] in [place] and has been following with urology, Dr. Mary Cruz here in [place] since moving here several years ago.   He did not require any adjuvant chemotherapy or radiation therapy. He does have routine PSA levels drawn and they have been normal.  He does have a history of erectile dysfunction since his prostatectomy. He has also had some nocturia but this is not too bothersome. He no longer takes anything for erectile dysfunction as he reports he and his wife are not currently sexually active. He has also had a kidney stone in the past.  He does have an appointment scheduled with his urologist for routine care. He also has a history of osteoarthritis of the left hip and did undergo total left hip arthroplasty at the end of 2020 with Dr. Thersa Bumpers. This is doing very well. He did see cardiology before this for clearance because of an abnormal EKG. His echocardiogram was normal.  He does have a history of several colon polyps - 3 serrated adenomas and a hyperplastic polyp found on his colonoscopy done in 2021 at [facility]. His colonoscopy also showed an AVM, hemorrhoids and diverticulosis. It was recommended that he repeat his colonoscopy in 3 years which will be in 2024. He does have a history of sleep disturbance for which he uses Ambien as needed. This is usually only when he is traveling or sleeping away from home. He states he will take a quarter to half of a tablet of a 5 mg Ambien and this works well to control his symptoms. He did get a refill from his doctor last month. He also has a history of anxiety usually with flying and several unforeseen circumstances. He states that he does have a prescription for Ativan that he will use as needed and usually takes a quarter to half a tablet of this. He does not use this regularly. He did get a refill from his doctor last month. He did have a normal chest xray in 2021. He did have a AAA screen but does not have those results - we will attempt to get those results. cmw      Review of Systems   Constitutional:  Negative for appetite change, diaphoresis, fatigue and fever.    HENT:  Negative for congestion, ear discharge, ear pain, hearing loss, rhinorrhea, sinus pressure, sore throat, trouble swallowing and voice change. Eyes:  Negative for pain, discharge, itching and visual disturbance. Respiratory:  Negative for cough, choking, chest tightness, shortness of breath, wheezing and stridor. Cardiovascular:  Negative for chest pain, palpitations and leg swelling. Gastrointestinal:  Negative for abdominal pain, blood in stool, constipation, diarrhea, nausea and vomiting. Endocrine: Negative for heat intolerance, polydipsia, polyphagia and polyuria. Genitourinary:  Negative for decreased urine volume, difficulty urinating, dysuria, frequency, hematuria and urgency. Erectile dysfunction and nocturia   Musculoskeletal:  Negative for arthralgias, back pain, gait problem, joint swelling, myalgias and neck pain. Skin:  Negative for color change, pallor, rash and wound. Allergic/Immunologic: Negative for immunocompromised state. Neurological:  Negative for dizziness, tremors, syncope, speech difficulty, weakness, light-headedness, numbness and headaches. Hematological:  Negative for adenopathy. Does not bruise/bleed easily. Psychiatric/Behavioral:  Positive for sleep disturbance. Negative for decreased concentration, dysphoric mood and self-injury. The patient is nervous/anxious. Objective     Physical Exam  Vitals and nursing note reviewed. Constitutional:       General: He is not in acute distress. Appearance: Normal appearance. He is well-developed and normal weight. He is not ill-appearing, toxic-appearing or diaphoretic. HENT:      Head: Normocephalic. Right Ear: Tympanic membrane, ear canal and external ear normal. There is no impacted cerumen. Left Ear: Tympanic membrane, ear canal and external ear normal. There is no impacted cerumen. Nose: Nose normal. No congestion or rhinorrhea.       Mouth/Throat:      Mouth: Mucous membranes are moist.      Pharynx: No oropharyngeal exudate or posterior oropharyngeal erythema. Eyes:      General: No scleral icterus. Right eye: No discharge. Left eye: No discharge. Conjunctiva/sclera: Conjunctivae normal.      Pupils: Pupils are equal, round, and reactive to light. Neck:      Thyroid: No thyromegaly. Vascular: No JVD. Cardiovascular:      Rate and Rhythm: Normal rate and regular rhythm. Heart sounds: Normal heart sounds. No murmur heard. Pulmonary:      Effort: Pulmonary effort is normal. No respiratory distress. Breath sounds: Normal breath sounds. No stridor. No wheezing or rales. Chest:      Chest wall: No tenderness. Abdominal:      General: Bowel sounds are normal. There is no distension. Palpations: Abdomen is soft. There is no mass. Tenderness: There is no abdominal tenderness. There is no right CVA tenderness or left CVA tenderness. Musculoskeletal:         General: No tenderness. Normal range of motion. Cervical back: Normal range of motion and neck supple. Right lower leg: No edema. Left lower leg: No edema. Lymphadenopathy:      Cervical: No cervical adenopathy. Skin:     General: Skin is warm. Coloration: Skin is not pale. Findings: No erythema or rash. Neurological:      General: No focal deficit present. Mental Status: He is alert and oriented to person, place, and time. Cranial Nerves: No cranial nerve deficit. Motor: No abnormal muscle tone. Coordination: Coordination normal.      Deep Tendon Reflexes: Reflexes are normal and symmetric. Reflexes normal.   Psychiatric:         Mood and Affect: Mood normal.         Behavior: Behavior normal.         Thought Content: Thought content normal.         Judgment: Judgment normal.            An electronic signature was used to authenticate this note.     --Sydnee Bal MD

## 2022-08-21 PROBLEM — K63.5 POLYP OF COLON: Status: ACTIVE | Noted: 2022-08-21

## 2022-08-21 PROBLEM — G47.9 SLEEP DISTURBANCE: Status: ACTIVE | Noted: 2022-08-21

## 2022-08-21 PROBLEM — Z96.642 S/P TOTAL LEFT HIP ARTHROPLASTY: Status: ACTIVE | Noted: 2022-08-21

## 2022-08-21 PROBLEM — F43.22 ADJUSTMENT DISORDER WITH ANXIETY: Status: ACTIVE | Noted: 2022-08-21

## 2022-08-21 PROBLEM — Z87.442 HISTORY OF KIDNEY STONES: Status: ACTIVE | Noted: 2022-08-21

## 2022-08-21 PROBLEM — K57.90 DIVERTICULOSIS: Status: ACTIVE | Noted: 2022-08-21

## 2022-08-21 PROBLEM — K55.20 AVM (ARTERIOVENOUS MALFORMATION) OF COLON: Status: ACTIVE | Noted: 2022-08-21

## 2022-08-21 PROBLEM — D12.6 TUBULAR ADENOMA OF COLON: Status: ACTIVE | Noted: 2022-08-21

## 2022-08-21 PROBLEM — M16.12 PRIMARY OSTEOARTHRITIS OF LEFT HIP: Status: ACTIVE | Noted: 2022-08-21

## 2022-08-21 ASSESSMENT — ENCOUNTER SYMPTOMS
COLOR CHANGE: 0
TROUBLE SWALLOWING: 0
SHORTNESS OF BREATH: 0
VOMITING: 0
COUGH: 0
EYE DISCHARGE: 0
SINUS PRESSURE: 0
RHINORRHEA: 0
BLOOD IN STOOL: 0
SORE THROAT: 0
ABDOMINAL PAIN: 0
NAUSEA: 0
CONSTIPATION: 0
WHEEZING: 0
CHOKING: 0
EYE ITCHING: 0
STRIDOR: 0
CHEST TIGHTNESS: 0
EYE PAIN: 0
BACK PAIN: 0
VOICE CHANGE: 0
DIARRHEA: 0

## 2022-11-23 ENCOUNTER — HOSPITAL ENCOUNTER (OUTPATIENT)
Age: 72
Setting detail: SPECIMEN
Discharge: HOME | End: 2022-11-23

## 2022-11-23 DIAGNOSIS — Z13.0 SCREENING FOR DEFICIENCY ANEMIA: ICD-10-CM

## 2022-11-23 DIAGNOSIS — Z13.1 SCREENING FOR DIABETES MELLITUS: ICD-10-CM

## 2022-11-23 DIAGNOSIS — Z13.220 SCREENING FOR CHOLESTEROL LEVEL: ICD-10-CM

## 2022-11-23 LAB
ALBUMIN SERPL-MCNC: 4.3 G/DL (ref 3.5–5.2)
ALBUMIN/GLOBULIN RATIO: 1.5 (ref 1–2.5)
ALP BLD-CCNC: 76 U/L (ref 40–129)
ALT SERPL-CCNC: 23 U/L (ref 5–41)
ANION GAP SERPL CALCULATED.3IONS-SCNC: 11 MMOL/L (ref 9–17)
AST SERPL-CCNC: 37 U/L
BILIRUB SERPL-MCNC: 0.5 MG/DL (ref 0.3–1.2)
BUN BLDV-MCNC: 27 MG/DL (ref 8–23)
CALCIUM SERPL-MCNC: 9.9 MG/DL (ref 8.6–10.4)
CHLORIDE BLD-SCNC: 103 MMOL/L (ref 98–107)
CHOLESTEROL/HDL RATIO: 4.4
CHOLESTEROL: 201 MG/DL
CO2: 26 MMOL/L (ref 20–31)
CREAT SERPL-MCNC: 0.94 MG/DL (ref 0.7–1.2)
GFR SERPL CREATININE-BSD FRML MDRD: >60 ML/MIN/1.73M2
GLUCOSE BLD-MCNC: 93 MG/DL (ref 70–99)
HCT VFR BLD CALC: 47 % (ref 40.7–50.3)
HDLC SERPL-MCNC: 46 MG/DL
HEMOGLOBIN: 15.3 G/DL (ref 13–17)
LDL CHOLESTEROL: 137 MG/DL (ref 0–130)
MCH RBC QN AUTO: 29.2 PG (ref 25.2–33.5)
MCHC RBC AUTO-ENTMCNC: 32.6 G/DL (ref 28.4–34.8)
MCV RBC AUTO: 89.7 FL (ref 82.6–102.9)
NRBC AUTOMATED: 0 PER 100 WBC
PDW BLD-RTO: 12.4 % (ref 11.8–14.4)
PLATELET # BLD: 196 K/UL (ref 138–453)
PMV BLD AUTO: 9.6 FL (ref 8.1–13.5)
POTASSIUM SERPL-SCNC: 4.2 MMOL/L (ref 3.7–5.3)
RBC # BLD: 5.24 M/UL (ref 4.21–5.77)
SODIUM BLD-SCNC: 140 MMOL/L (ref 135–144)
TOTAL PROTEIN: 7.1 G/DL (ref 6.4–8.3)
TRIGL SERPL-MCNC: 90 MG/DL
WBC # BLD: 4.9 K/UL (ref 3.5–11.3)

## 2022-11-25 DIAGNOSIS — E78.5 HYPERLIPIDEMIA, UNSPECIFIED HYPERLIPIDEMIA TYPE: Primary | ICD-10-CM

## 2022-11-28 SDOH — HEALTH STABILITY: PHYSICAL HEALTH: ON AVERAGE, HOW MANY MINUTES DO YOU ENGAGE IN EXERCISE AT THIS LEVEL?: 70 MIN

## 2022-11-28 SDOH — HEALTH STABILITY: PHYSICAL HEALTH: ON AVERAGE, HOW MANY DAYS PER WEEK DO YOU ENGAGE IN MODERATE TO STRENUOUS EXERCISE (LIKE A BRISK WALK)?: 7 DAYS

## 2022-11-28 ASSESSMENT — PATIENT HEALTH QUESTIONNAIRE - PHQ9
SUM OF ALL RESPONSES TO PHQ QUESTIONS 1-9: 0
SUM OF ALL RESPONSES TO PHQ9 QUESTIONS 1 & 2: 0
2. FEELING DOWN, DEPRESSED OR HOPELESS: 0
SUM OF ALL RESPONSES TO PHQ QUESTIONS 1-9: 0
1. LITTLE INTEREST OR PLEASURE IN DOING THINGS: 0

## 2022-11-28 ASSESSMENT — LIFESTYLE VARIABLES
HOW OFTEN DO YOU HAVE A DRINK CONTAINING ALCOHOL: 3
HOW OFTEN DO YOU HAVE A DRINK CONTAINING ALCOHOL: 2-4 TIMES A MONTH
HOW MANY STANDARD DRINKS CONTAINING ALCOHOL DO YOU HAVE ON A TYPICAL DAY: 1
HOW MANY STANDARD DRINKS CONTAINING ALCOHOL DO YOU HAVE ON A TYPICAL DAY: 1 OR 2
HOW OFTEN DO YOU HAVE SIX OR MORE DRINKS ON ONE OCCASION: 1

## 2022-11-30 ENCOUNTER — OFFICE VISIT (OUTPATIENT)
Dept: FAMILY MEDICINE CLINIC | Age: 72
End: 2022-11-30
Payer: MEDICARE

## 2022-11-30 VITALS
WEIGHT: 180.4 LBS | RESPIRATION RATE: 16 BRPM | DIASTOLIC BLOOD PRESSURE: 70 MMHG | SYSTOLIC BLOOD PRESSURE: 130 MMHG | TEMPERATURE: 97.3 F | HEIGHT: 72 IN | HEART RATE: 60 BPM | BODY MASS INDEX: 24.43 KG/M2

## 2022-11-30 DIAGNOSIS — Z00.00 INITIAL MEDICARE ANNUAL WELLNESS VISIT: Primary | ICD-10-CM

## 2022-11-30 DIAGNOSIS — Z23 NEED FOR INFLUENZA VACCINATION: ICD-10-CM

## 2022-11-30 PROCEDURE — G8484 FLU IMMUNIZE NO ADMIN: HCPCS | Performed by: PEDIATRICS

## 2022-11-30 PROCEDURE — G0438 PPPS, INITIAL VISIT: HCPCS | Performed by: PEDIATRICS

## 2022-11-30 PROCEDURE — 3017F COLORECTAL CA SCREEN DOC REV: CPT | Performed by: PEDIATRICS

## 2022-11-30 PROCEDURE — G0008 ADMIN INFLUENZA VIRUS VAC: HCPCS | Performed by: PEDIATRICS

## 2022-11-30 PROCEDURE — 90694 VACC AIIV4 NO PRSRV 0.5ML IM: CPT | Performed by: PEDIATRICS

## 2022-11-30 PROCEDURE — 1123F ACP DISCUSS/DSCN MKR DOCD: CPT | Performed by: PEDIATRICS

## 2022-11-30 NOTE — PROGRESS NOTES
Medicare Annual Wellness Visit    Sarah Ayoub is here for Medicare AWV    Assessment & Plan     Initial Medicare annual wellness visit  Need for influenza vaccination  -     Influenza, FLUAD, (age 72 y+), IM, Preservative Free, 0.5 mL        Completed AWV today  Flu vaccine today   Obtain record of other immunizations from previous PCP:      Dr. Enzo Gonzalez   [place], [address] [facility]  [phone]    Recommendations for Preventive Services Due: see orders and patient instructions/AVS.  Recommended screening schedule for the next 5-10 years is provided to the patient in written form: see Patient Instructions/AVS.           Return for Medicare Annual Wellness Visit in 1 year. Subjective       Patient's complete Health Risk Assessment and screening values have been reviewed and are found in Flowsheets. The following problems were reviewed today and where indicated follow up appointments were made and/or referrals ordered. Positive Risk Factor Screenings with Interventions:             General Health and ACP:  General  In general, how would you say your health is?: Excellent  In the past 7 days, have you experienced any of the following: New or Increased Pain, New or Increased Fatigue, Loneliness, Social Isolation, Stress or Anger?: No  Do you get the social and emotional support that you need?: Yes  Do you have a Living Will?: Yes    Advance Directives       Power of  Living Will ACP-Advance Directive ACP-Power of     Not on File Not on File Not on File Not on File        General Health Risk Interventions:  Copy of living will:  patient asked to bring in a copy of his living will for his medical record.                Objective   Vitals:    11/30/22 0910   BP: 130/70   Site: Right Upper Arm   Position: Sitting   Cuff Size: Medium Adult   Pulse: 60   Resp: 16   Temp: 97.3 °F (36.3 °C)   TempSrc: Temporal   Weight: 180 lb 6.4 oz (81.8 kg)   Height: 6' (1.829 m) Body mass index is 24.47 kg/m². No Known Allergies  Prior to Visit Medications    Medication Sig Taking? Authorizing Provider   zolpidem (AMBIEN) 5 MG tablet Take 1 tablet by mouth daily. Yes Historical Provider, MD   LORazepam (ATIVAN) 0.5 MG tablet Take 1 tablet by mouth daily as needed for Anxiety.  Yes Historical Provider, MD   tadalafil (CIALIS) 20 MG tablet Take 1 tablet by mouth daily as needed for Erectile Dysfunction Yes Irish Renee MD   ibuprofen (ADVIL;MOTRIN) 200 MG tablet Take 400 mg by mouth every 6 hours as needed for Pain Yes Historical Provider, MD   Multiple Vitamins-Minerals (THERAPEUTIC MULTIVITAMIN-MINERALS) tablet Take 1 tablet by mouth daily Yes Historical Provider, MD   PAPAVERINE-PHENTOLAMINE IC by IntraCAVernosal route Yes Historical Provider, MD   sildenafil (VIAGRA) 100 MG tablet Take 1 tablet by mouth as needed for Erectile Dysfunction  Patient not taking: No sig reported  Irish Renee MD       [facility] (Including outside providers/suppliers regularly involved in providing care):     Patient Care Team:  Breanna Singh MD as PCP - General (Internal Medicine)  Breanna Singh MD as PCP - REHABILITATION [facility] Empaneled Provider  Irish Renee MD as Consulting Physician (Urology)     Reviewed and updated this visit:  Tobacco  Allergies  Meds  Problems  Med Hx  Surg Hx  Soc Hx  Fam Hx

## 2022-11-30 NOTE — PATIENT INSTRUCTIONS
Personalized Preventive Plan for Boston Tsang - 11/30/2022  Medicare offers a range of preventive health benefits. Some of the tests and screenings are paid in full while other may be subject to a deductible, co-insurance, and/or copay. Some of these benefits include a comprehensive review of your medical history including lifestyle, illnesses that may run in your family, and various assessments and screenings as appropriate. After reviewing your medical record and screening and assessments performed today your provider may have ordered immunizations, labs, imaging, and/or referrals for you. A list of these orders (if applicable) as well as your Preventive Care list are included within your After Visit Summary for your review. Other Preventive Recommendations:    A preventive eye exam performed by an eye specialist is recommended every 1-2 years to screen for glaucoma; cataracts, macular degeneration, and other eye disorders. A preventive dental visit is recommended every 6 months. Try to get at least 150 minutes of exercise per week or 10,000 steps per day on a pedometer . Order or download the FREE \"Exercise & Physical Activity: Your Everyday Guide\" from [employer]. Call 4-360.903.1960 or search [employer] online. You need 1022-6913 mg of calcium and 3906-6344 IU of vitamin D per day. It is possible to meet your calcium requirement with diet alone, but a vitamin D supplement is usually necessary to meet this goal.  When exposed to the sun, use a sunscreen that protects against both UVA and UVB radiation with an SPF of 30 or greater. Reapply every 2 to 3 hours or after sweating, drying off with a towel, or swimming. Always wear a seat belt when traveling in a car. Always wear a helmet when riding a bicycle or motorcycle.

## 2023-01-04 ENCOUNTER — OFFICE VISIT (OUTPATIENT)
Dept: PRIMARY CARE CLINIC | Age: 73
End: 2023-01-04
Payer: MEDICARE

## 2023-01-04 VITALS
BODY MASS INDEX: 24.41 KG/M2 | TEMPERATURE: 97.9 F | DIASTOLIC BLOOD PRESSURE: 80 MMHG | HEART RATE: 64 BPM | SYSTOLIC BLOOD PRESSURE: 134 MMHG | WEIGHT: 180 LBS | OXYGEN SATURATION: 98 %

## 2023-01-04 DIAGNOSIS — B96.89 BACTERIAL SINUSITIS: Primary | ICD-10-CM

## 2023-01-04 DIAGNOSIS — J32.9 BACTERIAL SINUSITIS: Primary | ICD-10-CM

## 2023-01-04 PROCEDURE — G8427 DOCREV CUR MEDS BY ELIG CLIN: HCPCS | Performed by: PHYSICIAN ASSISTANT

## 2023-01-04 PROCEDURE — 1036F TOBACCO NON-USER: CPT | Performed by: PHYSICIAN ASSISTANT

## 2023-01-04 PROCEDURE — 1123F ACP DISCUSS/DSCN MKR DOCD: CPT | Performed by: PHYSICIAN ASSISTANT

## 2023-01-04 PROCEDURE — 3017F COLORECTAL CA SCREEN DOC REV: CPT | Performed by: PHYSICIAN ASSISTANT

## 2023-01-04 PROCEDURE — G8420 CALC BMI NORM PARAMETERS: HCPCS | Performed by: PHYSICIAN ASSISTANT

## 2023-01-04 PROCEDURE — 99213 OFFICE O/P EST LOW 20 MIN: CPT | Performed by: PHYSICIAN ASSISTANT

## 2023-01-04 PROCEDURE — G8484 FLU IMMUNIZE NO ADMIN: HCPCS | Performed by: PHYSICIAN ASSISTANT

## 2023-01-04 RX ORDER — PREDNISONE 20 MG/1
20 TABLET ORAL 2 TIMES DAILY
Qty: 10 TABLET | Refills: 0 | Status: SHIPPED | OUTPATIENT
Start: 2023-01-04 | End: 2023-01-09

## 2023-01-04 RX ORDER — AMOXICILLIN AND CLAVULANATE POTASSIUM 875; 125 MG/1; MG/1
1 TABLET, FILM COATED ORAL 2 TIMES DAILY
Qty: 20 TABLET | Refills: 0 | Status: SHIPPED | OUTPATIENT
Start: 2023-01-04 | End: 2023-01-14

## 2023-01-04 ASSESSMENT — ENCOUNTER SYMPTOMS
SHORTNESS OF BREATH: 0
SORE THROAT: 0
CHEST TIGHTNESS: 0
EYES NEGATIVE: 1
COUGH: 1
SINUS PAIN: 0
HOARSE VOICE: 0
SINUS PRESSURE: 1
GASTROINTESTINAL NEGATIVE: 1
RHINORRHEA: 0

## 2023-01-04 NOTE — PROGRESS NOTES
[address]  [address]  [address] [address] [facility]  Phone: [phone]  Fax: Rubén Shafer    Pt Name: Aissatou Blue  MRN: [id number]  [place]rug[place] 1950  Date of evaluation: 1/4/2023  Provider: Albino Travis PA-C     CHIEF COMPLAINT       Chief Complaint   Patient presents with    Nasal Congestion     Sx started 2 weeks ago. His cough and weird taste in his mouth and no appetite. Not sleeping well. Not taking much other than some advil. Right ear bothering him. Cough           HISTORY OF PRESENT ILLNESS  (Location/Symptom, Timing/Onset, Context/Setting, Quality, Duration, Modifying Factors, Severity.)   Aissatou Blue is a 74 y.o. White (non-) [1] male who presents to the office for evaluation of      Sinusitis  This is a new problem. The current episode started 1 to 4 weeks ago. There has been no fever. Associated symptoms include congestion, coughing, headaches and sinus pressure. Pertinent negatives include no ear pain, hoarse voice, shortness of breath, sneezing or sore throat. Past treatments include saline sprays. The treatment provided no relief. Nursing Notes were reviewed. REVIEW OF SYSTEMS    (2-9 systems for level 4, 10 or more for level 5)     Review of Systems   Constitutional:  Negative for fatigue and fever. HENT:  Positive for congestion, postnasal drip and sinus pressure. Negative for ear discharge, ear pain, hoarse voice, rhinorrhea, sinus pain, sneezing and sore throat. Eyes: Negative. Respiratory:  Positive for cough. Negative for chest tightness and shortness of breath. Cardiovascular: Negative. Gastrointestinal: Negative. Musculoskeletal: Negative. Neurological:  Positive for headaches. Except as noted above the remainder of the review of systems was reviewed andnegative. PAST MEDICAL HISTORY   History reviewed.     Past Medical History:   Diagnosis Date    ED (erectile dysfunction)     Elevated PSA Prostate cancer [facility])          SURGICAL HISTORY     History reviewed. Past Surgical History:   Procedure Laterality Date    ABDOMINAL HERNIA REPAIR      JOINT REPLACEMENT      hip    PROSTATECTOMY           CURRENT MEDICATIONS       Current Outpatient Medications   Medication Sig Dispense Refill    predniSONE (DELTASONE) 20 MG tablet Take 1 tablet by mouth 2 times daily for 5 days 10 tablet 0    amoxicillin-clavulanate (AUGMENTIN) 875-125 MG per tablet Take 1 tablet by mouth 2 times daily for 10 days 20 tablet 0    zolpidem (AMBIEN) 5 MG tablet Take 1 tablet by mouth daily. LORazepam (ATIVAN) 0.5 MG tablet Take 1 tablet by mouth daily as needed for Anxiety. tadalafil (CIALIS) 20 MG tablet Take 1 tablet by mouth daily as needed for Erectile Dysfunction 10 tablet 11    ibuprofen (ADVIL;MOTRIN) 200 MG tablet Take 400 mg by mouth every 6 hours as needed for Pain      Multiple Vitamins-Minerals (THERAPEUTIC MULTIVITAMIN-MINERALS) tablet Take 1 tablet by mouth daily      PAPAVERINE-PHENTOLAMINE IC by IntraCAVernosal route      sildenafil (VIAGRA) 100 MG tablet Take 1 tablet by mouth as needed for Erectile Dysfunction (Patient not taking: No sig reported) 10 tablet 11     No current facility-administered medications for this visit. ALLERGIES     Patient has no known allergies. FAMILY HISTORY     No family history on file. No family status information on file. SOCIAL HISTORY      reports that he has never smoked. He has never used smokeless tobacco. He reports current alcohol use of about 3.0 standard drinks per week. PHYSICAL EXAM    (up to 7 for level 4, 8 or more for level 5)     Vitals:    01/04/23 1210   BP: 134/80   Site: Right Upper Arm   Position: Sitting   Cuff Size: Medium Adult   Pulse: 64   Temp: 97.9 °F (36.6 °C)   SpO2: 98%   Weight: 180 lb (81.6 kg)         Physical Exam  Vitals and nursing note reviewed. Constitutional:       General: He is not in acute distress. Appearance: Normal appearance. He is not ill-appearing. HENT:      Head: Normocephalic and atraumatic. Right Ear: External ear normal.      Left Ear: External ear normal.      Nose: Congestion and rhinorrhea present. Right Sinus: Maxillary sinus tenderness present. Left Sinus: Maxillary sinus tenderness present. Mouth/Throat:      Mouth: Mucous membranes are moist.   Eyes:      Extraocular Movements: Extraocular movements intact. Conjunctiva/sclera: Conjunctivae normal.      Pupils: Pupils are equal, round, and reactive to light. Pulmonary:      Effort: Pulmonary effort is normal.   Abdominal:      Palpations: Abdomen is soft. Skin:     General: Skin is warm and dry. Neurological:      Mental Status: He is alert and oriented to person, place, and time. DIFFERENTIAL DIAGNOSIS:       Marcelle Adjutant reviewed the disposition diagnosis with the patient and or their family/guardian. I have answered their questions and given discharge instructions. They voiced understanding of these instructions and did not have anyfurther questions or complaints. PROCEDURES:  No orders of the defined types were placed in this encounter. No results found for this visit on 01/04/23. FINALIMPRESSION      Visit Diagnoses and Associated Orders       Bacterial sinusitis    -  Primary         ORDERS WITHOUT AN ASSOCIATED DIAGNOSIS    predniSONE (DELTASONE) 20 MG tablet [6496]      amoxicillin-clavulanate (AUGMENTIN) 875-125 MG per tablet [88139]                PLAN     Return if symptoms worsen or fail to improve.       DISCHARGEMEDICATIONS:  Orders Placed This Encounter   Medications    predniSONE (DELTASONE) 20 MG tablet     Sig: Take 1 tablet by mouth 2 times daily for 5 days     Dispense:  10 tablet     Refill:  0    amoxicillin-clavulanate (AUGMENTIN) 875-125 MG per tablet     Sig: Take 1 tablet by mouth 2 times daily for 10 days     Dispense:  20 tablet     Refill:  0         Plan:  Based on the duration and severity of the symptoms-- I will treat this as bacterial at this time. Patient instructed to complete antibiotic prescription fully. May use Motrin/Tylenol for fever/pain. Saline washes, salt water gargles and over the counter preparations if desired. Patient agreeable to treatment plan. Educational materials provided on AVS.  Follow up if symptoms do not improve/worsen. Patient instructed to return to the office if symptoms worsen, return, or have any other concerns. Patient understands and is agreeable.          Иван Jackson PA-C 1/4/2023 12:34 PM

## 2023-02-04 ENCOUNTER — PATIENT MESSAGE (OUTPATIENT)
Dept: FAMILY MEDICINE CLINIC | Age: 73
End: 2023-02-04

## 2023-02-06 NOTE — TELEPHONE ENCOUNTER
From: Benito Alba  To: Dr. Chapin Concordia: 2/4/2023 4:04 PM EST  Subject: Prescription request    Can you please write me a Prescription for:  Betamethasone Dipropionate cream UPS .05    My doctor in [place] prescribed this for me when eczema flared up on my fingers. I have run out of the cream & the eczema has returned.     Thank you _ Benito Alba

## 2023-02-07 RX ORDER — BETAMETHASONE DIPROPIONATE 0.5 MG/G
CREAM TOPICAL
Qty: 15 G | Refills: 1 | Status: SHIPPED | OUTPATIENT
Start: 2023-02-07 | End: 2024-02-06

## 2023-04-20 ENCOUNTER — OFFICE VISIT (OUTPATIENT)
Dept: PRIMARY CARE CLINIC | Age: 73
End: 2023-04-20

## 2023-04-20 ENCOUNTER — HOSPITAL ENCOUNTER (OUTPATIENT)
Age: 73
Setting detail: SPECIMEN
Discharge: HOME | End: 2023-04-20

## 2023-04-20 VITALS
DIASTOLIC BLOOD PRESSURE: 66 MMHG | BODY MASS INDEX: 23.33 KG/M2 | SYSTOLIC BLOOD PRESSURE: 130 MMHG | OXYGEN SATURATION: 99 % | TEMPERATURE: 98.1 F | HEART RATE: 56 BPM | WEIGHT: 172 LBS

## 2023-04-20 DIAGNOSIS — R53.83 FATIGUE, UNSPECIFIED TYPE: ICD-10-CM

## 2023-04-20 DIAGNOSIS — R63.4 WEIGHT LOSS, UNINTENTIONAL: ICD-10-CM

## 2023-04-20 DIAGNOSIS — R43.2 TASTE SENSE ALTERED: ICD-10-CM

## 2023-04-20 DIAGNOSIS — R53.83 FATIGUE, UNSPECIFIED TYPE: Primary | ICD-10-CM

## 2023-04-20 LAB
ABSOLUTE EOS #: 0.2 K/UL (ref 0–0.44)
ABSOLUTE IMMATURE GRANULOCYTE: <0.03 K/UL (ref 0–0.3)
ABSOLUTE LYMPH #: 1.13 K/UL (ref 1.1–3.7)
ABSOLUTE MONO #: 0.74 K/UL (ref 0.1–1.2)
ALBUMIN SERPL-MCNC: 4.5 G/DL (ref 3.5–5.2)
ALBUMIN/GLOBULIN RATIO: 1.7 (ref 1–2.5)
ALP SERPL-CCNC: 87 U/L (ref 40–129)
ALT SERPL-CCNC: 22 U/L (ref 5–41)
ANION GAP SERPL CALCULATED.3IONS-SCNC: 13 MMOL/L (ref 9–17)
AST SERPL-CCNC: 38 U/L
BASOPHILS # BLD: 0 % (ref 0–2)
BASOPHILS ABSOLUTE: <0.03 K/UL (ref 0–0.2)
BILIRUB SERPL-MCNC: 0.3 MG/DL (ref 0.3–1.2)
BUN SERPL-MCNC: 27 MG/DL (ref 8–23)
CALCIUM SERPL-MCNC: 10 MG/DL (ref 8.6–10.4)
CHLORIDE SERPL-SCNC: 103 MMOL/L (ref 98–107)
CO2 SERPL-SCNC: 24 MMOL/L (ref 20–31)
CREAT SERPL-MCNC: 0.95 MG/DL (ref 0.7–1.2)
EOSINOPHILS RELATIVE PERCENT: 3 % (ref 1–4)
GFR SERPL CREATININE-BSD FRML MDRD: >60 ML/MIN/1.73M2
GLUCOSE SERPL-MCNC: 70 MG/DL (ref 70–99)
HCT VFR BLD AUTO: 44.8 % (ref 40.7–50.3)
HGB BLD-MCNC: 14.8 G/DL (ref 13–17)
IMMATURE GRANULOCYTES: 0 %
LYMPHOCYTES # BLD: 16 % (ref 24–43)
MCH RBC QN AUTO: 29.4 PG (ref 25.2–33.5)
MCHC RBC AUTO-ENTMCNC: 33 G/DL (ref 28.4–34.8)
MCV RBC AUTO: 89.1 FL (ref 82.6–102.9)
MONOCYTES # BLD: 10 % (ref 3–12)
NRBC AUTOMATED: 0 PER 100 WBC
PDW BLD-RTO: 12.9 % (ref 11.8–14.4)
PLATELET # BLD AUTO: 206 K/UL (ref 138–453)
PMV BLD AUTO: 9.8 FL (ref 8.1–13.5)
POTASSIUM SERPL-SCNC: 5.1 MMOL/L (ref 3.7–5.3)
PROT SERPL-MCNC: 7.2 G/DL (ref 6.4–8.3)
RBC # BLD: 5.03 M/UL (ref 4.21–5.77)
SEG NEUTROPHILS: 71 % (ref 36–65)
SEGMENTED NEUTROPHILS ABSOLUTE COUNT: 5.16 K/UL (ref 1.5–8.1)
SODIUM SERPL-SCNC: 140 MMOL/L (ref 135–144)
WBC # BLD AUTO: 7.3 K/UL (ref 3.5–11.3)

## 2023-04-20 ASSESSMENT — ENCOUNTER SYMPTOMS
RHINORRHEA: 0
EYES NEGATIVE: 1
TROUBLE SWALLOWING: 0
GASTROINTESTINAL NEGATIVE: 1
SORE THROAT: 0
VOICE CHANGE: 0
RESPIRATORY NEGATIVE: 1
SINUS PAIN: 0
SINUS PRESSURE: 0

## 2023-04-20 NOTE — PROGRESS NOTES
[address]  [address] [facility]  Phone: [phone]  Fax: Rubén Shafer    Pt Name: Dennis Cristina  MRN: [id number]  Drug[place] 1950  Date of evaluation: 4/20/2023  Provider: Noman Mccormick PA-C     CHIEF COMPLAINT       Chief Complaint   Patient presents with    Taste Change     Taste change for 6 weeks. HISTORY OF PRESENT ILLNESS  (Location/Symptom, Timing/Onset, Context/Setting, Quality, Duration, Modifying Factors, Severity.)   Dennis Cristina is a 74 y.o. White (non-) [1] male who presents to the office for evaluation of      Patient presents today for evaluation of change in taste x6 weeks. Patient states he has had a round a 8 to 10 pound weight loss unintentionally since the beginning of the year. Patient denies any other symptoms other than fatigue. Patient denies GERD patient denies recent illness. Patient denies oral lesions and/or mouth sores patient denies any loss of smell. Patient has taken 2 at home COVID test and both were negative patient is vaccinated against COVID with one booster. Nursing Notes were reviewed. REVIEW OF SYSTEMS    (2-9 systems for level 4, 10 or more for level 5)     Review of Systems   Constitutional:  Positive for appetite change, fatigue and unexpected weight change. Negative for chills, diaphoresis and fever. HENT:  Negative for congestion, dental problem, ear discharge, ear pain, mouth sores, postnasal drip, rhinorrhea, sinus pressure, sinus pain, sore throat, trouble swallowing and voice change. Change in taste   Eyes: Negative. Respiratory: Negative. Cardiovascular: Negative. Gastrointestinal: Negative. Skin: Negative. Except as noted above the remainder of the review of systems was reviewed andnegative. PAST MEDICAL HISTORY   History reviewed.     Past Medical History:   Diagnosis Date    ED (erectile dysfunction)     Elevated PSA     Prostate

## 2023-05-29 SDOH — ECONOMIC STABILITY: FOOD INSECURITY: WITHIN THE PAST 12 MONTHS, YOU WORRIED THAT YOUR FOOD WOULD RUN OUT BEFORE YOU GOT MONEY TO BUY MORE.: NEVER TRUE

## 2023-05-29 SDOH — ECONOMIC STABILITY: FOOD INSECURITY: WITHIN THE PAST 12 MONTHS, THE FOOD YOU BOUGHT JUST DIDN'T LAST AND YOU DIDN'T HAVE MONEY TO GET MORE.: NEVER TRUE

## 2023-05-29 SDOH — ECONOMIC STABILITY: INCOME INSECURITY: HOW HARD IS IT FOR YOU TO PAY FOR THE VERY BASICS LIKE FOOD, HOUSING, MEDICAL CARE, AND HEATING?: NOT HARD AT ALL

## 2023-05-29 SDOH — ECONOMIC STABILITY: TRANSPORTATION INSECURITY
IN THE PAST 12 MONTHS, HAS LACK OF TRANSPORTATION KEPT YOU FROM MEETINGS, WORK, OR FROM GETTING THINGS NEEDED FOR DAILY LIVING?: NO

## 2023-05-29 SDOH — ECONOMIC STABILITY: HOUSING INSECURITY
IN THE LAST 12 MONTHS, WAS THERE A TIME WHEN YOU DID NOT HAVE A STEADY PLACE TO SLEEP OR SLEPT IN A SHELTER (INCLUDING NOW)?: NO

## 2023-05-31 ENCOUNTER — HOSPITAL ENCOUNTER (OUTPATIENT)
Age: 73
Setting detail: SPECIMEN
Discharge: HOME | End: 2023-05-31

## 2023-05-31 DIAGNOSIS — E78.5 HYPERLIPIDEMIA, UNSPECIFIED HYPERLIPIDEMIA TYPE: ICD-10-CM

## 2023-05-31 LAB
ALT SERPL-CCNC: 36 U/L (ref 5–41)
AST SERPL-CCNC: 42 U/L
CHOLEST SERPL-MCNC: 195 MG/DL
CHOLESTEROL/HDL RATIO: 4.1
HDLC SERPL-MCNC: 47 MG/DL
LDLC SERPL CALC-MCNC: 133 MG/DL (ref 0–130)
TRIGL SERPL-MCNC: 77 MG/DL

## 2023-06-01 ENCOUNTER — OFFICE VISIT (OUTPATIENT)
Dept: FAMILY MEDICINE CLINIC | Age: 73
End: 2023-06-01

## 2023-06-01 VITALS
WEIGHT: 173.8 LBS | RESPIRATION RATE: 16 BRPM | DIASTOLIC BLOOD PRESSURE: 84 MMHG | BODY MASS INDEX: 23.57 KG/M2 | HEART RATE: 80 BPM | TEMPERATURE: 97.3 F | SYSTOLIC BLOOD PRESSURE: 124 MMHG

## 2023-06-01 DIAGNOSIS — K63.5 HYPERPLASTIC COLONIC POLYP, UNSPECIFIED PART OF COLON: ICD-10-CM

## 2023-06-01 DIAGNOSIS — D12.6 TUBULAR ADENOMA OF COLON: ICD-10-CM

## 2023-06-01 DIAGNOSIS — Z96.642 S/P TOTAL LEFT HIP ARTHROPLASTY: ICD-10-CM

## 2023-06-01 DIAGNOSIS — Z85.46 HISTORY OF PROSTATE CANCER: Primary | ICD-10-CM

## 2023-06-01 DIAGNOSIS — F43.22 ADJUSTMENT DISORDER WITH ANXIETY: ICD-10-CM

## 2023-06-01 DIAGNOSIS — G47.9 SLEEP DISTURBANCE: ICD-10-CM

## 2023-06-01 DIAGNOSIS — R35.1 NOCTURIA: ICD-10-CM

## 2023-06-01 DIAGNOSIS — L30.9 ECZEMA, UNSPECIFIED TYPE: ICD-10-CM

## 2023-06-01 DIAGNOSIS — Z13.6 SCREENING FOR AAA (ABDOMINAL AORTIC ANEURYSM): ICD-10-CM

## 2023-06-01 DIAGNOSIS — Z87.442 HISTORY OF KIDNEY STONES: ICD-10-CM

## 2023-06-01 DIAGNOSIS — N52.31 ERECTILE DYSFUNCTION AFTER RADICAL PROSTATECTOMY: ICD-10-CM

## 2023-06-01 SDOH — ECONOMIC STABILITY: FOOD INSECURITY: WITHIN THE PAST 12 MONTHS, YOU WORRIED THAT YOUR FOOD WOULD RUN OUT BEFORE YOU GOT MONEY TO BUY MORE.: NEVER TRUE

## 2023-06-01 SDOH — ECONOMIC STABILITY: INCOME INSECURITY: HOW HARD IS IT FOR YOU TO PAY FOR THE VERY BASICS LIKE FOOD, HOUSING, MEDICAL CARE, AND HEATING?: NOT HARD AT ALL

## 2023-06-01 SDOH — ECONOMIC STABILITY: FOOD INSECURITY: WITHIN THE PAST 12 MONTHS, THE FOOD YOU BOUGHT JUST DIDN'T LAST AND YOU DIDN'T HAVE MONEY TO GET MORE.: NEVER TRUE

## 2023-06-01 ASSESSMENT — ENCOUNTER SYMPTOMS
SINUS PRESSURE: 0
VOMITING: 0
EYE PAIN: 0
BACK PAIN: 0
EYE DISCHARGE: 0
TROUBLE SWALLOWING: 0
SHORTNESS OF BREATH: 0
EYE ITCHING: 0
NAUSEA: 0
COLOR CHANGE: 0
ABDOMINAL PAIN: 0
WHEEZING: 0
BLOOD IN STOOL: 0
DIARRHEA: 0
SORE THROAT: 0
CONSTIPATION: 0
STRIDOR: 0
RHINORRHEA: 0
COUGH: 0
CHOKING: 0
CHEST TIGHTNESS: 0
VOICE CHANGE: 0

## 2023-06-01 ASSESSMENT — PATIENT HEALTH QUESTIONNAIRE - PHQ9
SUM OF ALL RESPONSES TO PHQ QUESTIONS 1-9: 0
SUM OF ALL RESPONSES TO PHQ QUESTIONS 1-9: 0
SUM OF ALL RESPONSES TO PHQ9 QUESTIONS 1 & 2: 0
SUM OF ALL RESPONSES TO PHQ QUESTIONS 1-9: 0
SUM OF ALL RESPONSES TO PHQ QUESTIONS 1-9: 0
2. FEELING DOWN, DEPRESSED OR HOPELESS: 0
1. LITTLE INTEREST OR PLEASURE IN DOING THINGS: 0

## 2023-07-21 ENCOUNTER — HOSPITAL ENCOUNTER (OUTPATIENT)
Dept: VASCULAR LAB | Age: 73
Discharge: HOME | End: 2023-07-21
Payer: MEDICARE

## 2023-07-21 DIAGNOSIS — Z13.6 SCREENING FOR AAA (ABDOMINAL AORTIC ANEURYSM): ICD-10-CM

## 2023-07-21 PROCEDURE — 76706 US ABDL AORTA SCREEN AAA: CPT

## 2023-10-11 ENCOUNTER — OFFICE VISIT (OUTPATIENT)
Dept: DERMATOLOGY | Age: 73
End: 2023-10-11
Payer: MEDICARE

## 2023-10-11 VITALS — HEIGHT: 72 IN | TEMPERATURE: 97.6 F | BODY MASS INDEX: 23.98 KG/M2 | WEIGHT: 177 LBS

## 2023-10-11 DIAGNOSIS — L81.4 LENTIGO: ICD-10-CM

## 2023-10-11 DIAGNOSIS — L57.0 ACTINIC KERATOSES: ICD-10-CM

## 2023-10-11 DIAGNOSIS — L57.8 ACTINIC SKIN DAMAGE: ICD-10-CM

## 2023-10-11 DIAGNOSIS — D48.9 NEOPLASM OF UNCERTAIN BEHAVIOR: Primary | ICD-10-CM

## 2023-10-11 PROCEDURE — 3017F COLORECTAL CA SCREEN DOC REV: CPT | Performed by: DERMATOLOGY

## 2023-10-11 PROCEDURE — G8484 FLU IMMUNIZE NO ADMIN: HCPCS | Performed by: DERMATOLOGY

## 2023-10-11 PROCEDURE — 1123F ACP DISCUSS/DSCN MKR DOCD: CPT | Performed by: DERMATOLOGY

## 2023-10-11 PROCEDURE — 99203 OFFICE O/P NEW LOW 30 MIN: CPT | Performed by: DERMATOLOGY

## 2023-10-11 PROCEDURE — 11102 TANGNTL BX SKIN SINGLE LES: CPT | Performed by: DERMATOLOGY

## 2023-10-11 PROCEDURE — 1036F TOBACCO NON-USER: CPT | Performed by: DERMATOLOGY

## 2023-10-11 PROCEDURE — 17003 DESTRUCT PREMALG LES 2-14: CPT | Performed by: DERMATOLOGY

## 2023-10-11 PROCEDURE — G8420 CALC BMI NORM PARAMETERS: HCPCS | Performed by: DERMATOLOGY

## 2023-10-11 PROCEDURE — 17000 DESTRUCT PREMALG LESION: CPT | Performed by: DERMATOLOGY

## 2023-10-11 PROCEDURE — G8427 DOCREV CUR MEDS BY ELIG CLIN: HCPCS | Performed by: DERMATOLOGY

## 2023-10-11 RX ORDER — BETAMETHASONE DIPROPIONATE 0.5 MG/G
CREAM TOPICAL 2 TIMES DAILY
COMMUNITY

## 2023-10-11 RX ORDER — LIDOCAINE HYDROCHLORIDE 10 MG/ML
0.3 INJECTION, SOLUTION EPIDURAL; INFILTRATION; INTRACAUDAL; PERINEURAL ONCE
Status: SHIPPED | OUTPATIENT
Start: 2023-10-11

## 2023-10-11 NOTE — PROGRESS NOTES
document that actually reflects the content of the visit, no guarantees can be provided that every mistake has been identified and corrected by editing.     Electronically signed by Gissel Marcelino MD on 10/11/23 at 3:15 PM EDT

## 2023-10-13 ENCOUNTER — NURSE ONLY (OUTPATIENT)
Dept: LAB | Age: 73
End: 2023-10-13

## 2023-10-17 NOTE — RESULT ENCOUNTER NOTE
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 It can be removed and cured by electrodessication and curettage, a simple in-office procedure with local anesthesia. After numbing, the lesion with be scraped and burned to completely remove it. Please schedule for HALF HOUR.

## 2023-10-23 RX ORDER — BETAMETHASONE DIPROPIONATE 0.5 MG/G
CREAM TOPICAL
Qty: 15 G | Refills: 0 | Status: SHIPPED | OUTPATIENT
Start: 2023-10-23 | End: 2024-10-23

## 2023-10-23 NOTE — TELEPHONE ENCOUNTER
LOV 6-1-23   RTO 12-4-23  LRF 3-7-23          Controlled Substance Monitoring:    Acute and Chronic Pain Monitoring:   RX Monitoring Periodic Controlled Substance Monitoring   8/17/2022   9:16 AM No signs of potential drug abuse or diversion identified. ;Random urine drug screen sent today.

## 2024-01-02 SDOH — HEALTH STABILITY: PHYSICAL HEALTH: ON AVERAGE, HOW MANY DAYS PER WEEK DO YOU ENGAGE IN MODERATE TO STRENUOUS EXERCISE (LIKE A BRISK WALK)?: 6 DAYS

## 2024-01-02 SDOH — HEALTH STABILITY: PHYSICAL HEALTH: ON AVERAGE, HOW MANY MINUTES DO YOU ENGAGE IN EXERCISE AT THIS LEVEL?: 70 MIN

## 2024-01-04 ENCOUNTER — OFFICE VISIT (OUTPATIENT)
Dept: ORTHOPEDIC SURGERY | Age: 74
End: 2024-01-04
Payer: MEDICARE

## 2024-01-04 DIAGNOSIS — Z96.642 S/P TOTAL LEFT HIP ARTHROPLASTY: Primary | ICD-10-CM

## 2024-01-04 DIAGNOSIS — M25.552 PAIN OF LEFT HIP: ICD-10-CM

## 2024-01-04 PROCEDURE — 1123F ACP DISCUSS/DSCN MKR DOCD: CPT | Performed by: ORTHOPAEDIC SURGERY

## 2024-01-04 PROCEDURE — 99203 OFFICE O/P NEW LOW 30 MIN: CPT | Performed by: ORTHOPAEDIC SURGERY

## 2024-01-04 NOTE — PROGRESS NOTES
normal  Neck: Normal range of motion Neck supple.    Respiratory/Cardio: Effort normal. No respiratory distress.  Musculoskeletal: Examination the patient's right hip notes no trochanteric tenderness.  He has minimal pain on flexion up to 90 degrees he has a mildly positive FADIR.  KELIN is unremarkable.  No problems with Stinchfield's negative straight leg raise no other contributory findings    Neurological: Patient is alert and oriented to person, place, and time. Normal strength. No sensory deficit.  Skin: Skin is warm and dry  Psychiatric: Behavior is normal. Thought content normal.  Nursing note and vitals reviewed.     Labs and Imaging:     XR taken today:  XR PELVIS (1-2 VIEWS)    Result Date: 1/4/2024  X-rays taken today reviewed by me show standing AP of the pelvis.  Patient had a previous left total hip arthroplasty with acetabular screw components appear to be in excellent position alignment without any periprosthetic complications.  Patient noted to have at least moderate borderline severe narrowing of the femoral acetabular joint on the right side.  He has moderate cam and pincer osteophytes as well with mild cystic changes no evidence for avascular process.           Orders Placed This Encounter   Procedures    XR HIP LEFT (1 VIEW)     Standing Status:   Future     Standing Expiration Date:   1/4/2025    IR ARTHR/ASP/INJ MAJOR JT/BURSA RIGHT WO US     Standing Status:   Future     Standing Expiration Date:   1/4/2025     Scheduling Instructions:      INTRA ARTICULAR INJECTION PREFERRED PROTOCOL           FOR  _right hip_________   INJECTION:                  4 cc Xylocaine, 1% plain      4 cc Marcaine, 0.5%      1 cc Depomedrol 80 mgm/cc OR              Celestone 6 mgm/cc       Assessment and Plan:  Previous left total of arthroplasty per Dr. 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 male who presents to the

## 2024-01-08 ENCOUNTER — TELEPHONE (OUTPATIENT)
Dept: ORTHOPEDIC SURGERY | Age: 74
End: 2024-01-08

## 2024-01-08 NOTE — TELEPHONE ENCOUNTER
----- Message from Ata Waller sent at 1/7/2024  8:55 AM EST -----  Regarding: Injection  Contact: [phone]  After injection on Tuesday is it OK to drive home? Will there be a option prior to injection for a anxiety relief med? Thank you

## 2024-01-08 NOTE — TELEPHONE ENCOUNTER
Patient returned a call verified he can play pickle ball in the morning before the injection, and how long before he can play after the injection.  I told him before he can play but not after the injection for at least 24 hours and IR will let him know exactly how long before he can return.  Patient voiced understanding.

## 2024-01-09 ENCOUNTER — HOSPITAL ENCOUNTER (OUTPATIENT)
Dept: INTERVENTIONAL RADIOLOGY/VASCULAR | Age: 74
Discharge: HOME | End: 2024-01-11
Payer: MEDICARE

## 2024-01-09 DIAGNOSIS — M25.552 PAIN OF LEFT HIP: ICD-10-CM

## 2024-01-09 DIAGNOSIS — Z96.642 S/P TOTAL LEFT HIP ARTHROPLASTY: ICD-10-CM

## 2024-01-09 PROCEDURE — 6360000002 HC RX W HCPCS: Performed by: RADIOLOGY

## 2024-01-09 PROCEDURE — 2500000003 HC RX 250 WO HCPCS: Performed by: RADIOLOGY

## 2024-01-09 PROCEDURE — 6360000004 HC RX CONTRAST MEDICATION: Performed by: ORTHOPAEDIC SURGERY

## 2024-01-09 PROCEDURE — 20610 DRAIN/INJ JOINT/BURSA W/O US: CPT

## 2024-01-09 PROCEDURE — 77002 NEEDLE LOCALIZATION BY XRAY: CPT

## 2024-01-09 RX ORDER — LIDOCAINE HYDROCHLORIDE 10 MG/ML
INJECTION, SOLUTION INFILTRATION; PERINEURAL PRN
Status: COMPLETED | OUTPATIENT
Start: 2024-01-09 | End: 2024-01-09

## 2024-01-09 RX ORDER — BUPIVACAINE HYDROCHLORIDE 5 MG/ML
INJECTION, SOLUTION PERINEURAL PRN
Status: COMPLETED | OUTPATIENT
Start: 2024-01-09 | End: 2024-01-09

## 2024-01-09 RX ORDER — METHYLPREDNISOLONE ACETATE 80 MG/ML
INJECTION, SUSPENSION INTRA-ARTICULAR; INTRALESIONAL; INTRAMUSCULAR; SOFT TISSUE PRN
Status: COMPLETED | OUTPATIENT
Start: 2024-01-09 | End: 2024-01-09

## 2024-01-09 RX ADMIN — IOHEXOL 4 ML: 240 INJECTION, SOLUTION INTRATHECAL; INTRAVASCULAR; INTRAVENOUS; ORAL at 16:33

## 2024-01-09 RX ADMIN — BUPIVACAINE HYDROCHLORIDE 4 ML: 5 INJECTION, SOLUTION PERINEURAL at 16:26

## 2024-01-09 RX ADMIN — LIDOCAINE HYDROCHLORIDE 4 ML: 10 INJECTION, SOLUTION INFILTRATION; PERINEURAL at 16:26

## 2024-01-09 RX ADMIN — METHYLPREDNISOLONE ACETATE 80 MG: 80 INJECTION, SUSPENSION INTRA-ARTICULAR; INTRALESIONAL; INTRAMUSCULAR; SOFT TISSUE at 16:27

## 2024-01-09 NOTE — PROGRESS NOTES
PT HAD RIGHT HIP INJECTION IN IR TODAY. TOLERATED WELL. BANDAID TO NEEDLE SITE. DISCHARGED AMBULATORY WITH INSTRUCTIONS.

## 2024-01-09 NOTE — TELEPHONE ENCOUNTER
LOV 06/01/2023   RTO 02/23/2024  LRF 10/23/2023          Controlled Substance Monitoring:    Acute and Chronic Pain Monitoring:   RX Monitoring Periodic Controlled Substance Monitoring   8/17/2022   9:16 AM No signs of potential drug abuse or diversion identified.;Random urine drug screen sent today.

## 2024-01-09 NOTE — BRIEF OP NOTE
Brief Postoperative Note    Ata Waller  YOB: 1950  [id number]    Pre-operative Diagnosis: Right hip pain    Post-operative Diagnosis: Same    Procedure: Right hip injection    Anesthesia: Local    Surgeons/Assistants: MD Diony    Estimated Blood Loss: less than 50     Complications: None    Specimens: Was Not Obtained    Findings: Successful right hip injection.     Electronically signed by FREDDIE Mancilla on 1/9/2024 at 4:44 PM

## 2024-01-10 RX ORDER — BETAMETHASONE DIPROPIONATE 0.5 MG/G
CREAM TOPICAL
Qty: 15 G | Refills: 0 | Status: SHIPPED | OUTPATIENT
Start: 2024-01-10 | End: 2025-01-08

## 2024-02-06 ENCOUNTER — PROCEDURE VISIT (OUTPATIENT)
Dept: DERMATOLOGY | Age: 74
End: 2024-02-06

## 2024-02-06 VITALS
SYSTOLIC BLOOD PRESSURE: 137 MMHG | DIASTOLIC BLOOD PRESSURE: 75 MMHG | WEIGHT: 179 LBS | HEART RATE: 64 BPM | OXYGEN SATURATION: 97 % | BODY MASS INDEX: 24.28 KG/M2 | TEMPERATURE: 98 F

## 2024-02-06 DIAGNOSIS — L30.8 OTHER ECZEMA: ICD-10-CM

## 2024-02-06 DIAGNOSIS — D09.9 SQUAMOUS CELL CARCINOMA IN SITU: Primary | ICD-10-CM

## 2024-02-06 RX ORDER — TRIAMCINOLONE ACETONIDE 1 MG/G
CREAM TOPICAL
Qty: 80 G | Refills: 1 | Status: SHIPPED | OUTPATIENT
Start: 2024-02-06

## 2024-02-06 NOTE — PROGRESS NOTES
Dermatology Surgery Pre-Visit Checklist    (Please complete with  Y (yes) / N (no) or explain)    Pathologic Diagnosis: SCC    Photo available of surgery site in media: y    Competent to give informed consent? y   If not, who is authorized to do so: y    Need for help for wound care: n   If so, who will do so: n    Are you diagnosed:   Diabetes: n   If yes, last A1C: n       HIV: n       Hepatitis: n       Current smoker: n  If yes, how much: n    So you have any of the following: Pacemaker: n       Defibrillator: n       Artificial Heart valve: n                Artificial Joints: y       Other Implantable Device: n       Organ Transplant: n       Other: n    Are you on blood thinners such as: Asprin: n       Warfarin/Coumadin: n       Other: n    Any allergies to the following:  Lidocaine: n       Iodine: n       Adhesive: n       Other: n

## 2024-02-06 NOTE — PROGRESS NOTES
Dermatology Procedure Note   [facility], [facility] DERMATOLOGY  [address]  [address]  [facility] [address]  Dept: [phone]  Dept Fax: [phone]      Procedure Date: 2/6/2024  Procedure Time: 10:15 AM    Procedure Practitioner: Hilda Marshall MD    Procedure: Lesion Destruction    Pre-Procedure Diagnosis: Squamous Cell Carcinoma in situ    Post-Procedure Diagnosis: Same as Pre-Procedure Diagnosis    Informed Consent: The procedure and its risks were explained including but not limited to pain, bleeding, infection, permanent scar, permanent pigment alteration and recurrence. Consent to proceed with the procedure was obtained from the patient or the parent by the practitioner    Time Out:  A time out was conducted immediately before starting the procedure that confirmed a final verification of the correct patient, correct procedure, and correct site.    Procedure Details:  Malignant Destruction: The procedure and its risks were explained including but not limited to pain, bleeding, infection, permanent scar, permanent pigment alteration and need for an additional procedure. Consent to proceed with the procedure was obtained from the patient or the parent. After cleaning with alcohol the 9 mm SCCIS on the left neck was anesthetized with buffered 1% lidocaine with epinephrine and treated with 3 rounds of curettage and fulguration. After the first round the defect was 15 mm. The defect was coated in Vaseline and a bandage was applied.      Procedure Performed By: iHlda Marshall MD    Estimated Blood Loss: Minimal    Pathologic Specimen: none sent    Procedure Tolerance: Good    Complication(s): None    Electronically signed by Hilda Marshall MD on 2/6/24 at 10:15 AM EST

## 2024-02-16 LAB
PROSTATE SPECIFIC ANTIGEN: 0.02 NG/ML
PSA, ULTRASENSITIVE: <0.02 NG/ML

## 2024-02-20 SDOH — HEALTH STABILITY: PHYSICAL HEALTH: ON AVERAGE, HOW MANY MINUTES DO YOU ENGAGE IN EXERCISE AT THIS LEVEL?: 50 MIN

## 2024-02-20 SDOH — HEALTH STABILITY: PHYSICAL HEALTH: ON AVERAGE, HOW MANY DAYS PER WEEK DO YOU ENGAGE IN MODERATE TO STRENUOUS EXERCISE (LIKE A BRISK WALK)?: 5 DAYS

## 2024-02-20 ASSESSMENT — PATIENT HEALTH QUESTIONNAIRE - PHQ9
SUM OF ALL RESPONSES TO PHQ QUESTIONS 1-9: 0
2. FEELING DOWN, DEPRESSED OR HOPELESS: 0
SUM OF ALL RESPONSES TO PHQ9 QUESTIONS 1 & 2: 0
SUM OF ALL RESPONSES TO PHQ QUESTIONS 1-9: 0
1. LITTLE INTEREST OR PLEASURE IN DOING THINGS: 0
SUM OF ALL RESPONSES TO PHQ QUESTIONS 1-9: 0
SUM OF ALL RESPONSES TO PHQ QUESTIONS 1-9: 0

## 2024-02-20 ASSESSMENT — LIFESTYLE VARIABLES
HOW OFTEN DO YOU HAVE A DRINK CONTAINING ALCOHOL: 3
HOW OFTEN DO YOU HAVE A DRINK CONTAINING ALCOHOL: 2-4 TIMES A MONTH
HOW MANY STANDARD DRINKS CONTAINING ALCOHOL DO YOU HAVE ON A TYPICAL DAY: 1 OR 2
HOW OFTEN DO YOU HAVE SIX OR MORE DRINKS ON ONE OCCASION: 1
HOW MANY STANDARD DRINKS CONTAINING ALCOHOL DO YOU HAVE ON A TYPICAL DAY: 1

## 2024-02-23 ENCOUNTER — OFFICE VISIT (OUTPATIENT)
Dept: FAMILY MEDICINE CLINIC | Age: 74
End: 2024-02-23

## 2024-02-23 VITALS
DIASTOLIC BLOOD PRESSURE: 78 MMHG | TEMPERATURE: 98 F | SYSTOLIC BLOOD PRESSURE: 148 MMHG | HEIGHT: 72 IN | HEART RATE: 67 BPM | WEIGHT: 180.2 LBS | OXYGEN SATURATION: 97 % | BODY MASS INDEX: 24.41 KG/M2

## 2024-02-23 DIAGNOSIS — E78.5 HYPERLIPIDEMIA, UNSPECIFIED HYPERLIPIDEMIA TYPE: ICD-10-CM

## 2024-02-23 DIAGNOSIS — Z96.642 S/P TOTAL LEFT HIP ARTHROPLASTY: ICD-10-CM

## 2024-02-23 DIAGNOSIS — G47.9 SLEEP DISTURBANCE: ICD-10-CM

## 2024-02-23 DIAGNOSIS — D12.6 TUBULAR ADENOMA OF COLON: ICD-10-CM

## 2024-02-23 DIAGNOSIS — F43.22 ADJUSTMENT DISORDER WITH ANXIETY: ICD-10-CM

## 2024-02-23 DIAGNOSIS — R35.1 NOCTURIA: ICD-10-CM

## 2024-02-23 DIAGNOSIS — M16.11 PRIMARY OSTEOARTHRITIS OF RIGHT HIP: ICD-10-CM

## 2024-02-23 DIAGNOSIS — L30.9 ECZEMA, UNSPECIFIED TYPE: ICD-10-CM

## 2024-02-23 DIAGNOSIS — M25.551 RIGHT HIP PAIN: ICD-10-CM

## 2024-02-23 DIAGNOSIS — Z87.442 HISTORY OF KIDNEY STONES: ICD-10-CM

## 2024-02-23 DIAGNOSIS — Z51.81 THERAPEUTIC DRUG MONITORING: ICD-10-CM

## 2024-02-23 DIAGNOSIS — Z85.46 HISTORY OF PROSTATE CANCER: ICD-10-CM

## 2024-02-23 DIAGNOSIS — N52.31 ERECTILE DYSFUNCTION AFTER RADICAL PROSTATECTOMY: ICD-10-CM

## 2024-02-23 DIAGNOSIS — Z85.828 HISTORY OF SCC (SQUAMOUS CELL CARCINOMA) OF SKIN: ICD-10-CM

## 2024-02-23 DIAGNOSIS — Z00.00 MEDICARE ANNUAL WELLNESS VISIT, SUBSEQUENT: Primary | ICD-10-CM

## 2024-02-23 DIAGNOSIS — K63.5 HYPERPLASTIC COLONIC POLYP, UNSPECIFIED PART OF COLON: ICD-10-CM

## 2024-02-23 DIAGNOSIS — R03.0 BLOOD PRESSURE ELEVATED WITHOUT HISTORY OF HTN: ICD-10-CM

## 2024-02-23 RX ORDER — LORAZEPAM 0.5 MG/1
1 TABLET ORAL DAILY PRN
Qty: 30 TABLET | Refills: 0 | Status: SHIPPED | OUTPATIENT
Start: 2024-02-23 | End: 2025-03-24

## 2024-02-23 RX ORDER — ZOLPIDEM TARTRATE 5 MG/1
5 TABLET ORAL DAILY
Qty: 30 TABLET | Refills: 0 | Status: SHIPPED | OUTPATIENT
Start: 2024-02-23 | End: 2025-03-24

## 2024-02-23 ASSESSMENT — ENCOUNTER SYMPTOMS
RHINORRHEA: 0
SINUS PRESSURE: 0
CHEST TIGHTNESS: 0
BLOOD IN STOOL: 0
VOMITING: 0
STRIDOR: 0
CHOKING: 0
EYE ITCHING: 0
WHEEZING: 0
ABDOMINAL PAIN: 0
TROUBLE SWALLOWING: 0
NAUSEA: 0
COUGH: 0
VOICE CHANGE: 0
BACK PAIN: 0
COLOR CHANGE: 0
EYE DISCHARGE: 0
SHORTNESS OF BREATH: 0
DIARRHEA: 0
EYE PAIN: 0
CONSTIPATION: 0
SORE THROAT: 0

## 2024-02-23 NOTE — PROGRESS NOTES
Medicare Annual Wellness Visit    Ata Waller is here for Medicare AWV    Assessment & Plan     Medicare annual wellness visit, subsequent      Recommendations for Preventive Services Due: see orders and patient instructions/AVS.  Recommended screening schedule for the next 5-10 years is provided to the patient in written form: see Patient Instructions/AVS.     Return in 1 year (on 2/23/2025).         Subjective       Patient's complete Health Risk Assessment and screening values have been reviewed and are found in Flowsheets. The following problems were reviewed today and where indicated follow up appointments were made and/or referrals ordered.    No Positive Risk Factors identified today.                                  Objective   Vitals:    02/23/24 1008   BP: (!) 148/78   Site: Left Upper Arm   Position: Sitting   Cuff Size: Medium Adult   Pulse: 67   Temp: 98 °F (36.7 °C)   SpO2: 97%   Weight: 81.7 kg (180 lb 3.2 oz)   Height: 1.829 m (6')      Body mass index is 24.44 kg/m².             No Known Allergies  Prior to Visit Medications    Medication Sig Taking? Authorizing Provider   triamcinolone (KENALOG) 0.1 % cream Apply to rash twice daily (not face, armpit or groin) Yes Hilda Marshall MD   betamethasone dipropionate 0.05 % cream APPLY TOPICALLY TO THE AFFECTED AREA TWICE DAILY Yes Cathy Romero MD   LORAZEPAM PO  Yes Yokasta Guerra MD   ZOLPIDEM TARTRATE PO  Yes Yokasta Guerra MD   Multiple Vitamins-Minerals (THERAPEUTIC MULTIVITAMIN-MINERALS) tablet Take 1 tablet by mouth daily Yes Yokasta Guerra MD   PAPAVERINE-PHENTOLAMINE IC by IntraCAVernosal route Yes Yokasta Guerra MD       CareTeam (Including outside providers/suppliers regularly involved in providing care):     Patient Care Team:  Cathy Romero MD as PCP - General (Internal Medicine)  Cathy Romero MD as PCP - Empaneled Provider  Emory Fisher MD as Consulting Physician

## 2024-02-23 NOTE — PROGRESS NOTES
Ata Waller (:  1950) is a 73 y.o. male,Established patient, here for evaluation of the following chief complaint(s):    Medicare AWV, Joint Pain, Sleep Problem, Anxiety, Cholesterol Problem, and Hypertension         ASSESSMENT/PLAN:    1. Medicare annual wellness visit, subsequent  -     Lipid Panel; Future  -     Comprehensive Metabolic Panel; Future  -     CBC; Future  2. History of prostate cancer  3. Erectile dysfunction after radical prostatectomy  4. Nocturia  5. History of kidney stones  6. S/P total left hip arthroplasty  7. Right hip pain  -     [facility] - [place]  8. Primary osteoarthritis of right hip  -     [facility] - [place]  9. Tubular adenoma of colon  10. Hyperplastic colonic polyp, unspecified part of colon  11. Sleep disturbance  -     zolpidem (AMBIEN) 5 MG tablet; Take 1 tablet by mouth daily. Max Daily Amount: 5 mg, Disp-30 tablet, R-0Normal  12. Adjustment disorder with anxiety  -     LORazepam (ATIVAN) 0.5 MG tablet; Take 2 tablets by mouth daily as needed for Anxiety. Max Daily Amount: 1 mg, Disp-30 tablet, R-0Normal  13. Therapeutic drug monitoring  -     POCT Rapid Drug Screen  14. Eczema, unspecified type  15. Hyperlipidemia, unspecified hyperlipidemia type  -     Lipid Panel; Future  -     Comprehensive Metabolic Panel; Future  -     CBC; Future  16. Blood pressure elevated without history of HTN  17. History of SCC (squamous cell carcinoma) of skin      Follow-up with urology as scheduled  Follow-up with Ortho prn   Trial of physical therapy for right hip pain  Repeat colonoscopy in 2024 - will give referral at next office visit  Obtain UDS and update CSM  Continue Ambien as needed for sleep disturbance  Good sleep hygiene recommended  Continue benzodiazepine as needed for flying or other stress inducing activities  Recommend frequent moisturizer to skin  Healthy diet and regular exercise encouraged  Obtain labs as

## 2024-02-29 LAB
ALBUMIN SERPL-MCNC: 4.6 G/DL (ref 3.5–5.2)
ALK PHOSPHATASE: 68 U/L (ref 40–125)
ALT SERPL-CCNC: 20 U/L (ref 5–50)
ANION GAP SERPL CALCULATED.3IONS-SCNC: 6 MEQ/L (ref 7–16)
AST SERPL-CCNC: 30 U/L (ref 9–50)
BILIRUB SERPL-MCNC: 0.4 MG/DL
BUN BLDV-MCNC: 29 MG/DL (ref 8–23)
CALCIUM SERPL-MCNC: 9.8 MG/DL (ref 8.5–10.5)
CHLORIDE BLD-SCNC: 105 MEQ/L (ref 95–107)
CHOLESTEROL/HDL RATIO: 4.2 RATIO
CHOLESTEROL: 175 MG/DL
CO2: 29 MEQ/L (ref 19–31)
CREAT SERPL-MCNC: 1.03 MG/DL (ref 0.8–1.4)
EGFR IF NONAFRICAN AMERICAN: 76 ML/MIN/1.73
GLUCOSE: 92 MG/DL (ref 70–99)
HCT VFR BLD CALC: 42.6 % (ref 40–51)
HDLC SERPL-MCNC: 42 MG/DL
HEMOGLOBIN: 14.8 G/DL (ref 13.5–17)
LDL CHOLESTEROL CALCULATED: 112 MG/DL
LDL/HDL RATIO: 2.7 RATIO
MCH RBC QN AUTO: 30.3 PG (ref 25–33)
MCHC RBC AUTO-ENTMCNC: 34.7 G/DL (ref 31–36)
MCV RBC AUTO: 87.1 FL (ref 80–99)
PDW BLD-RTO: 12.9 % (ref 11.5–15)
PLATELETS: 185 K/UL (ref 130–400)
PMV BLD AUTO: 9.9 FL (ref 9.3–13)
POTASSIUM SERPL-SCNC: 4.7 MEQ/L (ref 3.5–5.4)
RBC: 4.89 M/UL (ref 4.5–6.1)
SODIUM BLD-SCNC: 140 MEQ/L (ref 133–146)
TOTAL PROTEIN: 6.5 G/DL (ref 6.1–8.3)
TRIGL SERPL-MCNC: 107 MG/DL
VLDLC SERPL CALC-MCNC: 21 MG/DL
WBC: 4.1 K/UL (ref 3.5–11)

## 2024-03-04 RX ORDER — BETAMETHASONE DIPROPIONATE 0.5 MG/G
CREAM TOPICAL
Qty: 45 G | Refills: 2 | Status: SHIPPED | OUTPATIENT
Start: 2024-03-04 | End: 2025-03-04

## 2024-03-04 NOTE — TELEPHONE ENCOUNTER
LOV 2-23-24   RTO 6-13-24  LRF 1-10-24          Controlled Substance Monitoring:    Acute and Chronic Pain Monitoring:   RX Monitoring Periodic Controlled Substance Monitoring   2/23/2024  10:58 AM No signs of potential drug abuse or diversion identified.;Random urine drug screen sent today.

## 2024-03-05 DIAGNOSIS — F43.22 ADJUSTMENT DISORDER WITH ANXIETY: ICD-10-CM

## 2024-03-05 DIAGNOSIS — G47.9 SLEEP DISTURBANCE: ICD-10-CM

## 2024-03-05 NOTE — TELEPHONE ENCOUNTER
LOV 02/23/24   RTO 03/18/24  LRF     Ambien 5 mg.    2/23/24  Ativan 0.5          2/23/24      Controlled Substance Monitoring:    Acute and Chronic Pain Monitoring:   RX Monitoring Periodic Controlled Substance Monitoring   2/23/2024  10:58 AM No signs of potential drug abuse or diversion identified.;Random urine drug screen sent today.

## 2024-03-07 RX ORDER — ZOLPIDEM TARTRATE 5 MG/1
5 TABLET ORAL DAILY
Qty: 30 TABLET | Refills: 0 | Status: SHIPPED | OUTPATIENT
Start: 2024-03-07 | End: 2025-04-06

## 2024-03-07 RX ORDER — LORAZEPAM 0.5 MG/1
1 TABLET ORAL DAILY PRN
Qty: 30 TABLET | Refills: 0 | Status: SHIPPED | OUTPATIENT
Start: 2024-03-07 | End: 2025-04-06

## 2024-03-07 NOTE — TELEPHONE ENCOUNTER
It appears as though these medications failed to transmit to the pharmacy when they were sent 2 weeks ago and then the message we received regarding this was not addressed by staff.      Please apologize to patient that this was not taken care of 2 weeks ago when the prescriptions failed to transmit.  I will send again.  Please verify that these have sent.

## 2024-03-18 ENCOUNTER — HOSPITAL ENCOUNTER (OUTPATIENT)
Dept: PHYSICAL THERAPY | Facility: CLINIC | Age: 74
Setting detail: THERAPIES SERIES
Discharge: HOME | End: 2024-03-18
Payer: MEDICARE

## 2024-03-18 PROCEDURE — 97110 THERAPEUTIC EXERCISES: CPT

## 2024-03-18 PROCEDURE — 97161 PT EVAL LOW COMPLEX 20 MIN: CPT

## 2024-03-18 NOTE — CONSULTS
[] Fair  [] Poor   Suggested Professional Referral:  [x] No  [] Yes:  Barriers to Goal Achievement:  [x] No  [] Yes:  Domestic Concerns:  [x] No  [] Yes:    Pt. Education:  [] Plans/Goals, Risks/Benefits discussed  [] Home exercise program    Method of Education: [] Verbal  [] Demo  [] Written  Access Code: [email or web address]  URL: [email or web address]  Date: 03/18/2024  Prepared by: lC Gudino    Exercises  - Bryant Stretch on Table  - 2 x daily - 5 x weekly - 1 sets - 3 reps - 30 seconds hold  - Bridge on Heels  - 2 x daily - 5 x weekly - 2 sets - 10 reps  - Sidelying Hip Abduction at Wall  - 2 x daily - 5 x weekly - 2 sets - 10 reps  - Clamshell  - 2 x daily - 5 x weekly - 2 sets - 10 reps  - Prone Quadriceps Stretch with Strap  - 1 x daily - 5 x weekly - 1 sets - 3 reps - 30 sec hold  - Prone flying squirrels  - 2 x daily - 5 x weekly - 2 sets - 10 reps  - Prone Gluteal Sets  - 2 x daily - 5 x weekly - 1 sets - 10 reps - 10 seconds hold  - Prone Hip Extension - Two Pillows  - 2 x daily - 5 x weekly - 2 sets - 10 reps  - Prone Hip Extension with Bent Knee - Two Pillows  - 2 x daily - 5 x weekly - 2 sets - 10 reps    Comprehension of Education:  [] Verbalizes understanding.  [] Demonstrates understanding.  [x] Needs Review.  [] Demonstrates/verbalizes understanding of HEP/Ed previously given.    Treatment Plan:  [x] Therapeutic Exercise   02292  [] Iontophoresis: 4 mg/mL Dexamethasone Sodium Phosphate  mAmin  84795   [x] Therapeutic Activity  79156 [x] Vasopneumatic cold with compression  44329    [] Gait Training   62474 [] Ultrasound   49087   [x] Neuromuscular Re-education  50370 [] Electrical Stimulation Unattended  43391   [x] Manual Therapy  83355 [] Electrical Stimulation Attended  64816   [x] Instruction in HEP  [] Lumbar/Cervical Traction  07046     Frequency:  2 x/week for 16 visits        Today’s Treatment:  Modalities:   Precautions: mod arthritis of R hip; L ANUJ  Exercises:  Exercise Reps/

## 2024-03-25 ENCOUNTER — APPOINTMENT (OUTPATIENT)
Dept: PHYSICAL THERAPY | Facility: CLINIC | Age: 74
End: 2024-03-25
Payer: MEDICARE

## 2024-03-28 ENCOUNTER — APPOINTMENT (OUTPATIENT)
Dept: PHYSICAL THERAPY | Facility: CLINIC | Age: 74
End: 2024-03-28
Payer: MEDICARE

## 2024-04-01 ENCOUNTER — HOSPITAL ENCOUNTER (OUTPATIENT)
Dept: PHYSICAL THERAPY | Facility: CLINIC | Age: 74
Setting detail: THERAPIES SERIES
Discharge: HOME | End: 2024-04-01
Payer: MEDICARE

## 2024-04-01 PROCEDURE — 97110 THERAPEUTIC EXERCISES: CPT

## 2024-04-01 NOTE — FLOWSHEET NOTE
program as demonstrated by performance with correct form without cues.  Demonstrate Knowledge of fall prevention  LTG: (to be met in 16 treatments)  To be able to continue playing golf without pain, with goal of increasing drive distance by 15-20 yds.  To demonstrate 5/5 MMT with keon hip ext, ER and abd  To maximize R hip ROM  To be able to play pickleball without pain.   <4/10 pain in am  LEFI <7% interference                     Patient goals:   Strengthen muscles around hip so I can continue golf and pickleball-get through summer at least w/o surgery\"     Pt. Education:  [x] Yes  [] No  [x] Reviewed Prior HEP/Ed  Method of Education: [] Verbal  [] Demo  [x] Written    Access Code: [email or web address]  URL: [email or web address]  Date: 04/01/2024  Prepared by: Cl Gudino    Exercises  - Bryant Stretch on Table  - 2 x daily - 5 x weekly - 1 sets - 3 reps - 30 seconds hold  - Bridge on Heels  - 2 x daily - 5 x weekly - 2 sets - 10 reps  - Sidelying Hip Abduction at Wall  - 2 x daily - 5 x weekly - 2 sets - 10 reps  - Clamshell  - 2 x daily - 5 x weekly - 2 sets - 10 reps  - Prone Quadriceps Stretch with Strap  - 1 x daily - 5 x weekly - 1 sets - 3 reps - 30 sec hold  - Prone flying squirrels  - 2 x daily - 5 x weekly - 2 sets - 10 reps  - Lateral Step Down  - 2 x daily - 7 x weekly - 2 sets - 10 reps  - Backward Step Down  - 2 x daily - 5 x weekly - 2 sets - 10 reps  - Standing Terminal Knee Extension with Resistance  - 2 x daily - 5 x weekly - 2 sets - 20 reps  - Standing Hip Flexion with Anchored Resistance and Chair Support  - 2 x daily - 5 x weekly - 2 sets - 10 reps  - Standing Hip Extension with Anchored Resistance  - 2 x daily - 5 x weekly - 2 sets - 10 reps  - Standing Hip Abduction with Anchored Resistance  - 2 x daily - 5 x weekly - 2 sets - 10 reps  - Standing Hip Adduction with Anchored Resistance  - 2 x daily - 5 x weekly - 2 sets - 10 reps  Comprehension of Education:  [] Verbalizes understanding.  []

## 2024-04-04 ENCOUNTER — OFFICE VISIT (OUTPATIENT)
Age: 74
End: 2024-04-04
Payer: MEDICARE

## 2024-04-04 DIAGNOSIS — R35.1 NOCTURIA: ICD-10-CM

## 2024-04-04 DIAGNOSIS — N52.31 ERECTILE DYSFUNCTION AFTER RADICAL PROSTATECTOMY: Primary | ICD-10-CM

## 2024-04-04 DIAGNOSIS — Z85.46 HISTORY OF PROSTATE CANCER: ICD-10-CM

## 2024-04-04 LAB
BILIRUBIN, POC: NORMAL
BLOOD URINE, POC: NORMAL
CLARITY, POC: CLEAR
COLOR, POC: YELLOW
GLUCOSE URINE, POC: NORMAL
KETONES, POC: NORMAL
LEUKOCYTE EST, POC: NORMAL
NITRITE, POC: NORMAL
PH, POC: NORMAL
PROTEIN, POC: NORMAL
SPECIFIC GRAVITY, POC: NORMAL
UROBILINOGEN, POC: NORMAL

## 2024-04-04 PROCEDURE — 81003 URINALYSIS AUTO W/O SCOPE: CPT | Performed by: SPECIALIST

## 2024-04-04 PROCEDURE — 1123F ACP DISCUSS/DSCN MKR DOCD: CPT | Performed by: SPECIALIST

## 2024-04-04 PROCEDURE — 99214 OFFICE O/P EST MOD 30 MIN: CPT | Performed by: SPECIALIST

## 2024-04-04 NOTE — PROGRESS NOTES
Emory Fisher MD [facility] Urology Office Progress Note    Patient:  Ata Waller  YOB: 1950  Date: 4/4/24    HISTORY OF PRESENT ILLNESS:   The patient is a 73 y.o. male  No evidence of prostate cancer recurrence s/p 8/8/18 Robotic assisted laparoscopic radical prostatectomy since PSA is 0.02.   Patient using Intracorporeal Bimix as needed for Rx of ED.   Lower urinary tract symptoms: nocturia, 2 times per night   Last AUA Symptom Score (QOL): 2 (1)  Today's AUA Symptom Score (QOL): 2 (0)    Summary of old records:   Patient's son in law is Dr. Freddy Iraheta, moved from [place], plays tennis, [employer]  History of prostate cancer, s/p RALP 8/8/18 at [facility], T2 G7  ED: oral meds don't work, now on Bimix 0.75 mL prn; tadalafil (Cialis) 20 mg didn't work,   History of large abdominal hernia with mesh  Nocturia x 2    Additional History: none    Procedures Today: N/A    Urinalysis today:  Results for POC orders placed in visit on 04/04/24   POCT Urinalysis No Micro (Auto)   Result Value Ref Range    Color, UA yellow     Clarity, UA clear     Glucose, UA POC neg     Bilirubin, UA      Ketones, UA      Spec Grav, UA      Blood, UA POC trace-intact     pH, UA      Protein, UA POC neg     Urobilinogen, UA      Leukocytes, UA neg     Nitrite, UA neg        Last several PSA's:  Lab Results   Component Value Date    PSA 0.02 02/16/2024    PSA 0.02 02/02/2023    PSA 0.01 03/08/2022       Last total testosterone:  No results found for: \"TESTOSTERONE\"    Last BUN and creatinine:  Lab Results   Component Value Date    BUN 29 (H) 02/29/2024     Lab Results   Component Value Date    CREATININE 1.03 02/29/2024       Last CBC:  Lab Results   Component Value Date    WBC 4.1 02/29/2024    HGB 14.8 02/29/2024    HCT 42.6 02/29/2024    MCV 87.1 02/29/2024     02/29/2024       Additional Lab/Culture results: none    Imaging Reviewed during this Office Visit: none  (results were

## 2024-04-05 ENCOUNTER — HOSPITAL ENCOUNTER (OUTPATIENT)
Dept: PHYSICAL THERAPY | Facility: CLINIC | Age: 74
Setting detail: THERAPIES SERIES
Discharge: HOME | End: 2024-04-05
Payer: MEDICARE

## 2024-04-05 PROCEDURE — 97110 THERAPEUTIC EXERCISES: CPT

## 2024-04-05 NOTE — FLOWSHEET NOTE
[] [facility]  Outpatient Rehabilitation &  Therapy  [address]  [phone]  [phone] [] [facility]  Outpatient Rehabilitation &  Therapy  [address]  P: [phone]  F: [phone] [x] [facility]  Outpatient Rehabilitation &  Therapy  [address]  P: [phone]  F: [phone] [] [facility]  Outpatient Rehabilitation &  Therapy  [address]  [phone]  [phone] [] [facility]  Outpatient Rehabilitation &  Therapy  [address]   P: [phone]  F: [phone]  [] [facility]  Outpatient Rehabilitation &  Therapy  [address]  P: [phone]  F: [phone] [] [facility]  Outpatient Rehabilitation &  Therapy  [address]  P: [phone]  F: [phone] [] [facility]  Outpatient Rehabilitation &  Therapy  [address]  P: [phone]  F: [phone] [] [facility]  Outpatient Rehabilitation &  Therapy  [address]  P: [phone]  F: [phone]  [] [facility] Outpatient Rehabilitation &  Therapy  [address]  P: [phone]  F: [phone]     Physical Therapy Daily Treatment Note    Date:  2024  Patient Name:  Ata Waller     :  1950  MRN: [id number]  Physician: Dr. Cathy Romero Insurance: [facility] Medicare; Frank yr; vs based on med nec; no auth req; $20 copay; 4500/4133.54 remaining OOP   Medical Diagnosis: R hip pain           Rehab Codes: M25.551, M25.661, R26.89  Onset date:    24                                    Next Dr's appt.:    Visit# / total visits: 3/16   Cancels/No Shows: 0/0    Subjective:    Pain:  [] Yes  [x] No Location: R hip  Pain Rating: (0-10 scale) 0/10  Pain altered Tx:  [x] No  [] Yes  Action:  Comments: Played 3 games of [employer]umitra  hand grasp, leg strength strong and equal bilaterally

## 2024-04-09 NOTE — PROGRESS NOTES
buttocks, digits and/or nails, was examined. Genital exam was deferred as patient denied having any lesions in this area. Complete visualization of scalp may be limited by hair density, length, and/or style    Facial covering was removed during examination.    Diagnoses/exam findings/medical history pertinent to this visit are listed below:    Assessment:   Diagnosis Orders   1. History of squamous cell carcinoma in situ        2. Actinic skin damage        3. Lentigo        4. Actinic keratoses        5. Hand dermatitis             Plan:  History of SCCIS   - well healed scar with no nodularity  - no evidence of recurrence    Actinic skin damage  - patient was counseled that UV-damaged skin increases lifetime risk for skin cancer  - I recommended the patient apply broad spectrum spf 30+ sunscreen daily, reapplying every 2 hours.  - In additional to regular use of sunscreen, I recommended broad-rimmed hats, long sleeves, and seeking the shade.    Solar lentigo   - patient was counseled that UV-damaged skin increases lifetime risk for skin cancer  - I recommended the patient apply broad spectrum spf 30+ sunscreen daily, reapplying every 2 hours.  - In additional to regular use of sunscreen, I recommended broad-rimmed hats, long sleeves, and seeking the shade.  - continue monitoring lesion on left forehead     Actinic keratoses  Cryotherapy: After verbal consent was obtained including discussion of the risks (lesion persistence, lesion recurrence and hypo/hyperpigmentation) and benefits (resolution of the lesion), and alternative therapies, 6 total Actinic Keratosis on the nose, temples, and scalp were treated with liquid nitrogen in a spray gun for a single 6 second freeze-thaw cycle for each lesion. The patient tolerated the procedure well and there were no immediate complications.    Hand dermatitis   - start clobetasol cream BID       RTC 6M FBSE    Future Appointments   Date Time Provider Department Center

## 2024-04-11 ENCOUNTER — OFFICE VISIT (OUTPATIENT)
Dept: DERMATOLOGY | Age: 74
End: 2024-04-11

## 2024-04-11 VITALS
DIASTOLIC BLOOD PRESSURE: 75 MMHG | WEIGHT: 177.2 LBS | HEIGHT: 72 IN | TEMPERATURE: 97.8 F | BODY MASS INDEX: 24 KG/M2 | HEART RATE: 75 BPM | SYSTOLIC BLOOD PRESSURE: 126 MMHG | OXYGEN SATURATION: 97 %

## 2024-04-11 DIAGNOSIS — L57.8 ACTINIC SKIN DAMAGE: ICD-10-CM

## 2024-04-11 DIAGNOSIS — Z86.007 HISTORY OF SQUAMOUS CELL CARCINOMA IN SITU: Primary | ICD-10-CM

## 2024-04-11 DIAGNOSIS — L81.4 LENTIGO: ICD-10-CM

## 2024-04-11 DIAGNOSIS — L57.0 ACTINIC KERATOSES: ICD-10-CM

## 2024-04-11 DIAGNOSIS — L30.9 HAND DERMATITIS: ICD-10-CM

## 2024-04-11 RX ORDER — CLOBETASOL PROPIONATE 0.5 MG/G
OINTMENT TOPICAL
Qty: 60 G | Refills: 2 | Status: CANCELLED | OUTPATIENT
Start: 2024-04-11

## 2024-04-11 NOTE — PATIENT INSTRUCTIONS
Sun Protection     There are two types of sun rays that are harmful to the skin.  UVA rays cause skin aging and skin cancer, such as melanoma.  UVB rays cause sunburns, cataracts, and also contribute to skin cancer.    The American-Academy of Dermatology recommends that children and adults wear a broad spectrum, waterproof sunscreen with a Sun Protection Factor (SPF) of 30 or higher.  It is important to check the ingredient label to be sure the sunscreen will protect the skin from both UVA and UVB sunrays.  Your sunscreen should contain at least one of the following ingredients: titanium dioxide, zinc oxide, or avobenzone.    Sunscreen will not be effective unless it is applied to all exposed skin.  Sunscreens work best if they are applied 30 minutes before sun exposure.  They should be reapplied every 2 hours and after any water exposure.    Sunscreen is not perfect.  It is important to use other methods to protect the skin from sun exposure also.  Wear hats, sunglasses and other sun protective clothing when outdoors.  Stay in the shade during the peak hours of sun exposure between 10 AM and 4 PM.

## 2024-04-12 ENCOUNTER — HOSPITAL ENCOUNTER (OUTPATIENT)
Dept: PHYSICAL THERAPY | Facility: CLINIC | Age: 74
Setting detail: THERAPIES SERIES
Discharge: HOME | End: 2024-04-12
Payer: MEDICARE

## 2024-04-12 PROCEDURE — 97110 THERAPEUTIC EXERCISES: CPT

## 2024-04-12 RX ORDER — CLOBETASOL PROPIONATE 0.5 MG/G
CREAM TOPICAL
Qty: 60 G | Refills: 2 | Status: SHIPPED | OUTPATIENT
Start: 2024-04-12

## 2024-04-12 NOTE — FLOWSHEET NOTE
[x] [facility]  Outpatient Rehabilitation &  Therapy  [address]  P: [phone]  F: [phone]     Physical Therapy Daily Treatment Note    Date:  2024  Patient Name:  Ata Waller     :  1950  MRN: [id number]  Physician: Dr. Cathy Romero  Insurance: [facility] Medicare; Frank yr; vs based on med nec; no auth req; $20 copay; 4500/4133.54 remaining OOP   Medical Diagnosis: R hip pain           Rehab Codes: M25.551, M25.661, R26.89  Onset date:    24                                   Next Dr's appt.:    Visit# / total visits:    Cancels/No Shows: 0/0    Subjective:    Pain:  [] Yes  [x] No Location: R hip  Pain Rating: (0-10 scale) 0/10  Pain altered Tx:  [x] No  [] Yes  Action:  Comments: pt reports that his R hip is feeling very good and is able to play golf and pickleball without issues.  He feels that he is very close to where his goals are.   Uses HP in am.      Objective:  Modalities:   Precautions: mod arthritis of R hip; L ANUJ  Exercises:  Exercise Reps/ Time Weight/ Level Completed Comments   [employer] bike 5'                    Prone           Quad stretch 3x30\"   -  w/ strap   Hip ext 10x ea   - 2 pillows  Bent and straight   Flying squirrels 10   - Towel b/w feet   Sidelying           Clamshells 2x10 lime X Alt w/ hip abd   Hip abd 2x10 lime X Alt w/ clamshells    Supine           bridges 2x15 purple -- Issued purple band   1 legged bridges 2x10  X     Gym           PM knee ext 2x10 10 lbs -- 2 legged   Squats 20 2x10 lb X      declined   Due to R knee    TKE 20x3\" purple   issued blue   4 way hip 2x10 blue  Issued orange   Step downs 10x ea 6\"  post and lateral   Lunges 15    Alternating // bars   Other:     Specific Instructions for next treatment:   Continue with above program   Modify HEP as updated      Treatment Charges: Mins Units   []  Modalities     [x]  Ther Exercise 45 3   []  Manual Therapy     []  Ther Activities     []  Neuro Re-ed

## 2024-04-19 ENCOUNTER — APPOINTMENT (OUTPATIENT)
Dept: PHYSICAL THERAPY | Facility: CLINIC | Age: 74
End: 2024-04-19
Payer: MEDICARE

## 2024-04-24 ENCOUNTER — OFFICE VISIT (OUTPATIENT)
Dept: FAMILY MEDICINE CLINIC | Age: 74
End: 2024-04-24

## 2024-04-24 VITALS
SYSTOLIC BLOOD PRESSURE: 146 MMHG | HEART RATE: 68 BPM | DIASTOLIC BLOOD PRESSURE: 80 MMHG | OXYGEN SATURATION: 99 % | TEMPERATURE: 98 F | BODY MASS INDEX: 24.14 KG/M2 | WEIGHT: 178 LBS

## 2024-04-24 DIAGNOSIS — J06.9 VIRAL URI: Primary | ICD-10-CM

## 2024-04-24 DIAGNOSIS — R05.1 ACUTE COUGH: ICD-10-CM

## 2024-04-24 DIAGNOSIS — R09.81 CONGESTION OF NASAL SINUS: ICD-10-CM

## 2024-04-24 ASSESSMENT — ENCOUNTER SYMPTOMS
SINUS PAIN: 0
SINUS PRESSURE: 0
CHEST TIGHTNESS: 0
NAUSEA: 0
DIARRHEA: 0
SHORTNESS OF BREATH: 0
CONSTIPATION: 0
SORE THROAT: 0
WHEEZING: 0
RHINORRHEA: 1

## 2024-04-24 NOTE — PROGRESS NOTES
Glucose, UA POC 04/04/2024 neg   Final    Blood, UA POC 04/04/2024 trace-intact   Final    Protein, UA POC 04/04/2024 neg   Final    Leukocytes, UA 04/04/2024 neg   Final    Nitrite, UA 04/04/2024 neg   Final    PSA 02/16/2024 0.02  ng/mL Final   Office Visit on 02/23/2024   Component Date Value Ref Range Status    Alcohol, Urine 02/23/2024 neg   Final    Amphetamine Screen, Urine 02/23/2024 neg   Final    Barbiturate Screen, Urine 02/23/2024 neg   Final    Benzodiazepine Screen, Urine 02/23/2024 neg   Final    Buprenorphine Urine 02/23/2024 neg   Final    Cocaine Metabolite Screen, Urine 02/23/2024 neg   Final    FENTANYL SCREEN, URINE 02/23/2024 neg   Final    Gabapentin Screen, Urine 02/23/2024 neg   Final    MDMA, Urine 02/23/2024 neg   Final    Methadone Screen, Urine 02/23/2024 neg   Final    Methamphetamine, Urine 02/23/2024 neg   Final    Opiate Scrn, Ur 02/23/2024 neg   Final    Oxycodone Screen, Ur 02/23/2024 neg   Final    PCP Screen, Urine 02/23/2024 neg   Final    Propoxyphene Screen, Urine 02/23/2024 neg   Final    Synthetic Cannabinoids (K2) Screen* 02/23/2024 neg   Final    THC Screen, Urine 02/23/2024 neg   Final    Tramadol Scrn, Ur 02/23/2024 neg   Final    Tricyclic Antidepressants, Urine 02/23/2024 neg   Final    Cholesterol 02/29/2024 175  <200 mg/dL Final    Triglycerides 02/29/2024 107  <150 mg/dL Final    HDL 02/29/2024 42  >39 mg/dL Final    LDL Calculated 02/29/2024 112 (H)  <100 mg/dL Final    VLDL Cholesterol Calculated 02/29/2024 21  <30 mg/dL Final    LDL/HDL Ratio 02/29/2024 2.7  <3.55 RATIO Final    Chol/HDL Ratio 02/29/2024 4.2  <4.97 RATIO Final    WBC 02/29/2024 4.1  3.5 - 11.0 K/uL Final    RBC 02/29/2024 4.89  4.50 - 6.10 M/uL Final    Hemoglobin 02/29/2024 14.8  13.5 - 17.0 g/dL Final    Hematocrit 02/29/2024 42.6  40.0 - 51.0 % Final    MCV 02/29/2024 87.1  80.0 - 99.0 fL Final    MCH 02/29/2024 30.3  25.0 - 33.0 pg Final    MCHC 02/29/2024 34.7  31.0 - 36.0 g/dL Final    RDW

## 2024-05-22 NOTE — DISCHARGE SUMMARY
[] [facility]  Outpatient Rehabilitation &  Therapy  [address]  [phone]  [phone] [] [facility]  Outpatient Rehabilitation &  Therapy  [address]  P: [phone]  F: [phone] [x] [facility]  Outpatient Rehabilitation &  Therapy  [address]  P: [phone]  F: [phone] [] [facility]  Outpatient Rehabilitation &  Therapy  [address]  [phone]  [phone] [] [facility]  Outpatient Rehabilitation &  Therapy  [address]   P: [phone]  F: [phone]  [] [facility]  Outpatient Rehabilitation &  Therapy  [address]  P: [phone]  F: [phone] [] [facility]  Outpatient Rehabilitation &  Therapy  [address]  P: [phone]  F: [phone] [] [facility]  Outpatient Rehabilitation &  Therapy  [address]  P: [phone]  F: [phone] [] [facility]  Outpatient Rehabilitation &  Therapy  [address]  P: [phone]  F: [phone]  [] [facility] Outpatient Rehabilitation &  Therapy  [address]  P: [phone]  F: [phone]     Physical Therapy Discharge Note    Date: 2024      Patient: Ata Waller    : 1950  MRN: [id number]    Physician: Dr. Cathy Romeor   Insurance: [facility] Medicare; Frank yr; vs based on med nec; no auth req; $20 copay; 4500/4133.54 remaining OOP   Medical Diagnosis: R hip pain                     Rehab Codes: M25.551, M25.661, R26.89  Onset date:    24                              Next Dr's appt.:    Visit# / total visits:                            Cancels/No Shows: 0  Date of initial visit:   3/18/24               Date of final visit: 24    Assessment: copied from last visit: Pt is very pleased with his

## 2024-06-10 SDOH — ECONOMIC STABILITY: INCOME INSECURITY: HOW HARD IS IT FOR YOU TO PAY FOR THE VERY BASICS LIKE FOOD, HOUSING, MEDICAL CARE, AND HEATING?: NOT HARD AT ALL

## 2024-06-10 SDOH — ECONOMIC STABILITY: FOOD INSECURITY: WITHIN THE PAST 12 MONTHS, YOU WORRIED THAT YOUR FOOD WOULD RUN OUT BEFORE YOU GOT MONEY TO BUY MORE.: NEVER TRUE

## 2024-06-10 SDOH — ECONOMIC STABILITY: FOOD INSECURITY: WITHIN THE PAST 12 MONTHS, THE FOOD YOU BOUGHT JUST DIDN'T LAST AND YOU DIDN'T HAVE MONEY TO GET MORE.: NEVER TRUE

## 2024-06-13 ENCOUNTER — OFFICE VISIT (OUTPATIENT)
Dept: FAMILY MEDICINE CLINIC | Age: 74
End: 2024-06-13

## 2024-06-13 VITALS
TEMPERATURE: 98.5 F | HEART RATE: 78 BPM | SYSTOLIC BLOOD PRESSURE: 120 MMHG | DIASTOLIC BLOOD PRESSURE: 80 MMHG | WEIGHT: 174 LBS | BODY MASS INDEX: 23.57 KG/M2 | HEIGHT: 72 IN | OXYGEN SATURATION: 95 %

## 2024-06-13 DIAGNOSIS — R94.31 ABNORMAL EKG: ICD-10-CM

## 2024-06-13 DIAGNOSIS — I20.9 ANGINA PECTORIS (HCC): ICD-10-CM

## 2024-06-13 DIAGNOSIS — R35.1 NOCTURIA: ICD-10-CM

## 2024-06-13 DIAGNOSIS — M16.11 PRIMARY OSTEOARTHRITIS OF RIGHT HIP: ICD-10-CM

## 2024-06-13 DIAGNOSIS — R07.9 CHEST PAIN, UNSPECIFIED TYPE: ICD-10-CM

## 2024-06-13 DIAGNOSIS — Z96.642 S/P TOTAL LEFT HIP ARTHROPLASTY: ICD-10-CM

## 2024-06-13 DIAGNOSIS — Z87.442 HISTORY OF KIDNEY STONES: ICD-10-CM

## 2024-06-13 DIAGNOSIS — Z85.46 HISTORY OF PROSTATE CANCER: Primary | ICD-10-CM

## 2024-06-13 DIAGNOSIS — M25.551 RIGHT HIP PAIN: ICD-10-CM

## 2024-06-13 DIAGNOSIS — Z86.79 HISTORY OF RIGHT BUNDLE BRANCH BLOCK (RBBB): ICD-10-CM

## 2024-06-13 DIAGNOSIS — L30.9 ECZEMA, UNSPECIFIED TYPE: ICD-10-CM

## 2024-06-13 DIAGNOSIS — K63.5 HYPERPLASTIC COLONIC POLYP, UNSPECIFIED PART OF COLON: ICD-10-CM

## 2024-06-13 DIAGNOSIS — E78.5 HYPERLIPIDEMIA, UNSPECIFIED HYPERLIPIDEMIA TYPE: ICD-10-CM

## 2024-06-13 DIAGNOSIS — Z85.828 HISTORY OF SCC (SQUAMOUS CELL CARCINOMA) OF SKIN: ICD-10-CM

## 2024-06-13 DIAGNOSIS — D12.6 TUBULAR ADENOMA OF COLON: ICD-10-CM

## 2024-06-13 DIAGNOSIS — I45.10 RBBB: ICD-10-CM

## 2024-06-13 DIAGNOSIS — G47.9 SLEEP DISTURBANCE: ICD-10-CM

## 2024-06-13 DIAGNOSIS — N52.31 ERECTILE DYSFUNCTION AFTER RADICAL PROSTATECTOMY: ICD-10-CM

## 2024-06-13 DIAGNOSIS — F43.22 ADJUSTMENT DISORDER WITH ANXIETY: ICD-10-CM

## 2024-06-13 SDOH — ECONOMIC STABILITY: FOOD INSECURITY: WITHIN THE PAST 12 MONTHS, YOU WORRIED THAT YOUR FOOD WOULD RUN OUT BEFORE YOU GOT MONEY TO BUY MORE.: NEVER TRUE

## 2024-06-13 SDOH — ECONOMIC STABILITY: FOOD INSECURITY: WITHIN THE PAST 12 MONTHS, THE FOOD YOU BOUGHT JUST DIDN'T LAST AND YOU DIDN'T HAVE MONEY TO GET MORE.: NEVER TRUE

## 2024-06-13 SDOH — ECONOMIC STABILITY: INCOME INSECURITY: HOW HARD IS IT FOR YOU TO PAY FOR THE VERY BASICS LIKE FOOD, HOUSING, MEDICAL CARE, AND HEATING?: NOT HARD AT ALL

## 2024-06-13 NOTE — PROGRESS NOTES
Ata Waller (:  1950) is a 73 y.o. male,Established patient, here for evaluation of the following chief complaint(s):    Anxiety      Assessment & Plan     1. History of prostate cancer  2. Erectile dysfunction after radical prostatectomy  3. Nocturia  4. History of kidney stones  5. S/P total left hip arthroplasty  6. Right hip pain  7. Primary osteoarthritis of right hip  8. Tubular adenoma of colon  9. Hyperplastic colonic polyp, unspecified part of colon  10. Sleep disturbance  11. Adjustment disorder with anxiety  12. Eczema, unspecified type  13. Hyperlipidemia, unspecified hyperlipidemia type  14. Chest pain, unspecified type  -     EKG 12 lead; Future  -     Exercise stress test; Future  -     Echo (TTE) complete (PRN contrast/bubble/strain/3D); Future  15. Angina pectoris (HCC)  -     Exercise stress test; Future  16. History of right bundle branch block (RBBB)  17. RBBB  -     Exercise stress test; Future  -     Echo (TTE) complete (PRN contrast/bubble/strain/3D); Future  18. Abnormal EKG  -     Exercise stress test; Future  -     Echo (TTE) complete (PRN contrast/bubble/strain/3D); Future  19. History of SCC (squamous cell carcinoma) of skin        Follow-up with urology as scheduled  Follow-up with Ortho as needed  Physical therapy as needed for worsening right hip pain  Repeat colonoscopy in the 2024 -will send referral to Dr. Heller at next office visit in August  UDS and CSM are up-to-date  Continue Ambien as needed for sleep disturbance  Good sleep hygiene recommended  Continue benzodiazepine as needed for flying or other stress inducing activities  Recommend frequent moisturizer to skin  Healthy diet and regular exercise encouraged  Labs are up-to-date  Obtain 12 lead EKG:  RBBB with anteroseptal defect - age indeterminate - I have no old EKG to compare this to other than his report of a history of RBBB.  I would recommend repeat stress testing and echocardiogram   Follow up with

## 2024-07-11 ENCOUNTER — TELEPHONE (OUTPATIENT)
Age: 74
End: 2024-07-11

## 2024-07-11 NOTE — TELEPHONE ENCOUNTER
Patient returned call.  Location and time of appointment confirmed. NPO status enforced. Questions answered from patient.

## 2024-07-12 ENCOUNTER — HOSPITAL ENCOUNTER (OUTPATIENT)
Age: 74
End: 2024-07-12
Payer: MEDICARE

## 2024-07-12 VITALS — WEIGHT: 174 LBS | BODY MASS INDEX: 23.57 KG/M2 | HEIGHT: 72 IN

## 2024-07-12 VITALS — DIASTOLIC BLOOD PRESSURE: 80 MMHG | SYSTOLIC BLOOD PRESSURE: 148 MMHG | HEART RATE: 64 BPM

## 2024-07-12 DIAGNOSIS — I45.10 RBBB: ICD-10-CM

## 2024-07-12 DIAGNOSIS — R07.9 CHEST PAIN, UNSPECIFIED TYPE: ICD-10-CM

## 2024-07-12 DIAGNOSIS — I20.9 ANGINA PECTORIS (HCC): ICD-10-CM

## 2024-07-12 DIAGNOSIS — R94.31 ABNORMAL EKG: ICD-10-CM

## 2024-07-12 LAB
ECHO AO ROOT DIAM: 3.1 CM
ECHO AO ROOT INDEX: 1.54 CM/M2
ECHO AV AREA PEAK VELOCITY: 2.4 CM2
ECHO AV AREA VTI: 2.7 CM2
ECHO AV AREA/BSA PEAK VELOCITY: 1.2 CM2/M2
ECHO AV AREA/BSA VTI: 1.3 CM2/M2
ECHO AV MEAN GRADIENT: 4 MMHG
ECHO AV MEAN VELOCITY: 1 M/S
ECHO AV PEAK GRADIENT: 6 MMHG
ECHO AV PEAK VELOCITY: 1.3 M/S
ECHO AV VELOCITY RATIO: 0.54
ECHO AV VTI: 27.2 CM
ECHO BSA: 2 M2
ECHO EST RA PRESSURE: 3 MMHG
ECHO IVC EXP: 1.5 CM
ECHO IVC INSP: 0.5 CM
ECHO LA AREA 2C: 18 CM2
ECHO LA AREA 4C: 16.1 CM2
ECHO LA DIAMETER INDEX: 1.59 CM/M2
ECHO LA DIAMETER: 3.2 CM
ECHO LA MAJOR AXIS: 4.7 CM
ECHO LA MINOR AXIS: 5 CM
ECHO LA TO AORTIC ROOT RATIO: 1.03
ECHO LA VOL BP: 51 ML (ref 18–58)
ECHO LA VOL MOD A2C: 54 ML (ref 18–58)
ECHO LA VOL MOD A4C: 46 ML (ref 18–58)
ECHO LA VOL/BSA BIPLANE: 25 ML/M2 (ref 16–34)
ECHO LA VOLUME INDEX MOD A2C: 27 ML/M2 (ref 16–34)
ECHO LA VOLUME INDEX MOD A4C: 23 ML/M2 (ref 16–34)
ECHO LV E' LATERAL VELOCITY: 4 CM/S
ECHO LV E' SEPTAL VELOCITY: 4 CM/S
ECHO LV FRACTIONAL SHORTENING: 34 % (ref 28–44)
ECHO LV INTERNAL DIMENSION DIASTOLE INDEX: 2.19 CM/M2
ECHO LV INTERNAL DIMENSION DIASTOLIC: 4.4 CM (ref 4.2–5.9)
ECHO LV INTERNAL DIMENSION SYSTOLIC INDEX: 1.44 CM/M2
ECHO LV INTERNAL DIMENSION SYSTOLIC: 2.9 CM
ECHO LV IVSD: 1 CM (ref 0.6–1)
ECHO LV MASS 2D: 137.8 G (ref 88–224)
ECHO LV MASS INDEX 2D: 68.5 G/M2 (ref 49–115)
ECHO LV POSTERIOR WALL DIASTOLIC: 0.9 CM (ref 0.6–1)
ECHO LV RELATIVE WALL THICKNESS RATIO: 0.41
ECHO LVOT AREA: 4.2 CM2
ECHO LVOT AV VTI INDEX: 0.64
ECHO LVOT DIAM: 2.3 CM
ECHO LVOT MEAN GRADIENT: 1 MMHG
ECHO LVOT PEAK GRADIENT: 2 MMHG
ECHO LVOT PEAK VELOCITY: 0.7 M/S
ECHO LVOT STROKE VOLUME INDEX: 36.2 ML/M2
ECHO LVOT SV: 72.7 ML
ECHO LVOT VTI: 17.5 CM
ECHO MV A VELOCITY: 0.71 M/S
ECHO MV E DECELERATION TIME (DT): 222 MS
ECHO MV E VELOCITY: 0.66 M/S
ECHO MV E/A RATIO: 0.93
ECHO MV E/E' LATERAL: 16.5
ECHO MV E/E' RATIO (AVERAGED): 16.5
ECHO MV E/E' SEPTAL: 16.5
ECHO RIGHT VENTRICULAR SYSTOLIC PRESSURE (RVSP): 17 MMHG
ECHO RV BASAL DIMENSION: 3.2 CM
ECHO RV FREE WALL PEAK S': 9 CM/S
ECHO TV REGURGITANT MAX VELOCITY: 1.85 M/S
ECHO TV REGURGITANT PEAK GRADIENT: 14 MMHG
STRESS BASELINE DIAS BP: 74 MMHG
STRESS BASELINE HR: 58 BPM
STRESS BASELINE SYS BP: 151 MMHG
STRESS ESTIMATED WORKLOAD: 10.1 METS
STRESS EXERCISE DUR MIN: 9 MIN
STRESS EXERCISE DUR SEC: 2 SEC
STRESS PEAK DIAS BP: 70 MMHG
STRESS PEAK SYS BP: 204 MMHG
STRESS PERCENT HR ACHIEVED: 104 %
STRESS POST PEAK HR: 151 BPM
STRESS RATE PRESSURE PRODUCT: NORMAL BPM*MMHG
STRESS TARGET HR: 145 BPM

## 2024-07-12 PROCEDURE — 93306 TTE W/DOPPLER COMPLETE: CPT | Performed by: INTERNAL MEDICINE

## 2024-07-12 PROCEDURE — 93017 CV STRESS TEST TRACING ONLY: CPT

## 2024-07-12 PROCEDURE — 93306 TTE W/DOPPLER COMPLETE: CPT

## 2024-07-12 PROCEDURE — 93016 CV STRESS TEST SUPVJ ONLY: CPT | Performed by: INTERNAL MEDICINE

## 2024-07-12 PROCEDURE — 93018 CV STRESS TEST I&R ONLY: CPT | Performed by: INTERNAL MEDICINE

## 2024-07-12 RX ORDER — SODIUM CHLORIDE 9 MG/ML
500 INJECTION, SOLUTION INTRAVENOUS CONTINUOUS PRN
Status: DISCONTINUED | OUTPATIENT
Start: 2024-07-12 | End: 2024-07-12

## 2024-07-12 RX ORDER — NITROGLYCERIN 0.4 MG/1
0.4 TABLET SUBLINGUAL EVERY 5 MIN PRN
Status: DISCONTINUED | OUTPATIENT
Start: 2024-07-12 | End: 2024-07-12

## 2024-07-12 RX ORDER — METOPROLOL TARTRATE 1 MG/ML
5 INJECTION, SOLUTION INTRAVENOUS EVERY 5 MIN PRN
Status: DISCONTINUED | OUTPATIENT
Start: 2024-07-12 | End: 2024-07-12

## 2024-07-12 RX ORDER — ALBUTEROL SULFATE 90 UG/1
2 AEROSOL, METERED RESPIRATORY (INHALATION) PRN
Status: DISCONTINUED | OUTPATIENT
Start: 2024-07-12 | End: 2024-07-12

## 2024-07-12 RX ORDER — ATROPINE SULFATE 0.1 MG/ML
0.5 INJECTION INTRAVENOUS EVERY 5 MIN PRN
Status: DISCONTINUED | OUTPATIENT
Start: 2024-07-12 | End: 2024-07-12

## 2024-07-12 RX ORDER — SODIUM CHLORIDE 0.9 % (FLUSH) 0.9 %
5-40 SYRINGE (ML) INJECTION PRN
Status: DISCONTINUED | OUTPATIENT
Start: 2024-07-12 | End: 2024-07-12

## 2024-07-12 NOTE — PROGRESS NOTES
Patient arrived for treadmill stress test. Consent signed and questions answered. Dr. Atwood will be present during the exam.

## 2024-07-16 ENCOUNTER — TELEPHONE (OUTPATIENT)
Age: 74
End: 2024-07-16

## 2024-07-16 DIAGNOSIS — I45.10 RBBB: ICD-10-CM

## 2024-07-16 DIAGNOSIS — R07.9 CHEST PAIN, UNSPECIFIED TYPE: ICD-10-CM

## 2024-07-16 DIAGNOSIS — I20.9 ANGINA PECTORIS (HCC): ICD-10-CM

## 2024-07-16 DIAGNOSIS — R94.31 ABNORMAL EKG: ICD-10-CM

## 2024-07-16 DIAGNOSIS — R94.39 ABNORMAL STRESS TEST: Primary | ICD-10-CM

## 2024-07-19 ENCOUNTER — OFFICE VISIT (OUTPATIENT)
Age: 74
End: 2024-07-19

## 2024-07-19 ENCOUNTER — TELEPHONE (OUTPATIENT)
Age: 74
End: 2024-07-19

## 2024-07-19 VITALS
HEART RATE: 65 BPM | SYSTOLIC BLOOD PRESSURE: 110 MMHG | OXYGEN SATURATION: 100 % | WEIGHT: 175.8 LBS | BODY MASS INDEX: 23.84 KG/M2 | DIASTOLIC BLOOD PRESSURE: 70 MMHG

## 2024-07-19 DIAGNOSIS — R07.9 CHEST PAIN, UNSPECIFIED TYPE: Primary | ICD-10-CM

## 2024-07-19 RX ORDER — ASPIRIN 81 MG/1
81 TABLET ORAL DAILY
Qty: 90 TABLET | Refills: 0 | Status: SHIPPED | OUTPATIENT
Start: 2024-07-19

## 2024-07-19 NOTE — TELEPHONE ENCOUNTER
Cath (lt/cors) scheduled 7/29/24 a [place] with Dr Atwood.  Patient given verbal instructions via phone and verbalized understanding.

## 2024-07-19 NOTE — PROGRESS NOTES
Cardiology Office Consultation           CC: Patient is here to establish cardiac care for chest pain and dyspnea on exertion.     HPI  Ata Waller is here to establish cardiac care.  Over last few weeks he reports intermittent episodes of left upper chest pain and tightness and occasional epigastric burning on exertion  Also reports exertional dyspnea and decline in functional capacity in recent past  Underwent echo and stress test as below         Past Medical:  Past Medical History:   Diagnosis Date    ED (erectile dysfunction)     Elevated PSA     Prostate cancer (HCC)        Past Surgical:  Past Surgical History:   Procedure Laterality Date    ABDOMINAL HERNIA REPAIR      JOINT REPLACEMENT      hip    PROSTATECTOMY         Family History:  No family history on file.    Social History:  Social History     Tobacco Use    Smoking status: Former     Current packs/day: 0.00     Types: Cigarettes     Start date: 1969     Quit date: 1987     Years since quittin.2    Smokeless tobacco: Never   Substance Use Topics    Alcohol use: Yes     Alcohol/week: 3.0 standard drinks of alcohol     Types: 3 Standard drinks or equivalent per week        REVIEW OF SYSTEMS:    Constitutional: there has been decline in activity level.     Eyes: No visual changes or diplopia. No scleral icterus.  ENT: No Headaches, hearing loss or vertigo. No mouth sores or sore throat.  Cardiovascular: As described in HPI.   Respiratory: AS HPI  Gastrointestinal: No abdominal pain, appetite loss, blood in stools. No change in bowel or bladder habits.  Genitourinary: +prostate ca  Musculoskeletal:  No gait disturbance, No weakness or joint complaints.  Integumentary: No rash or pruritis.  Neurological: No headache, diplopia, change in muscle strength, numbness or tingling  Psychiatric: No new anxiety or depression.  Endocrine: No temperature intolerance. No excessive thirst, fluid intake, or urination. No

## 2024-07-19 NOTE — TELEPHONE ENCOUNTER
Patient called and would like to know when he can be scheduled for his cardiac cath procedure. He would like a call back today if possible with an update.    Otezla Pregnancy And Lactation Text: This medication is Pregnancy Category C and it isn't known if it is safe during pregnancy. It is unknown if it is excreted in breast milk.

## 2024-07-29 ENCOUNTER — HOSPITAL ENCOUNTER (INPATIENT)
Age: 74
LOS: 1 days | Discharge: HOME | DRG: 322 | End: 2024-07-30
Attending: INTERNAL MEDICINE | Admitting: INTERNAL MEDICINE
Payer: MEDICARE

## 2024-07-29 DIAGNOSIS — R94.39 ABNORMAL STRESS ECG: ICD-10-CM

## 2024-07-29 DIAGNOSIS — R07.9 RECURRENT CHEST PAIN: ICD-10-CM

## 2024-07-29 DIAGNOSIS — Z98.61 S/P PTCA (PERCUTANEOUS TRANSLUMINAL CORONARY ANGIOPLASTY): Primary | ICD-10-CM

## 2024-07-29 PROBLEM — Z98.890 S/P CARDIAC CATH: Status: ACTIVE | Noted: 2024-07-29

## 2024-07-29 LAB
ACT BLD: 224 SEC (ref 79–149)
BUN BLD-MCNC: 31 MG/DL (ref 8–26)
CA-I BLD-SCNC: 1.3 MMOL/L (ref 1.15–1.33)
CHLORIDE BLD-SCNC: 106 MMOL/L (ref 98–107)
EGFR, POC: 89 ML/MIN/1.73M2
GLUCOSE BLD-MCNC: 90 MG/DL (ref 74–100)
HCT VFR BLD AUTO: 46 % (ref 41–53)
PLATELET # BLD AUTO: 207 K/UL (ref 140–450)
POC CREATININE: 0.9 MG/DL (ref 0.51–1.19)
POC HEMOGLOBIN (CALC): 15.7 G/DL (ref 13.5–17.5)
POTASSIUM BLD-SCNC: 4.2 MMOL/L (ref 3.5–4.5)
SODIUM BLD-SCNC: 142 MMOL/L (ref 138–146)

## 2024-07-29 PROCEDURE — 82330 ASSAY OF CALCIUM: CPT

## 2024-07-29 PROCEDURE — 6370000000 HC RX 637 (ALT 250 FOR IP): Performed by: INTERNAL MEDICINE

## 2024-07-29 PROCEDURE — B2151ZZ FLUOROSCOPY OF LEFT HEART USING LOW OSMOLAR CONTRAST: ICD-10-PCS | Performed by: INTERNAL MEDICINE

## 2024-07-29 PROCEDURE — 92978 ENDOLUMINL IVUS OCT C 1ST: CPT | Performed by: INTERNAL MEDICINE

## 2024-07-29 PROCEDURE — 99221 1ST HOSP IP/OBS SF/LOW 40: CPT | Performed by: INTERNAL MEDICINE

## 2024-07-29 PROCEDURE — 93458 L HRT ARTERY/VENTRICLE ANGIO: CPT | Performed by: INTERNAL MEDICINE

## 2024-07-29 PROCEDURE — C1725 CATH, TRANSLUMIN NON-LASER: HCPCS | Performed by: INTERNAL MEDICINE

## 2024-07-29 PROCEDURE — B240ZZ3 ULTRASONOGRAPHY OF SINGLE CORONARY ARTERY, INTRAVASCULAR: ICD-10-PCS | Performed by: INTERNAL MEDICINE

## 2024-07-29 PROCEDURE — 82565 ASSAY OF CREATININE: CPT

## 2024-07-29 PROCEDURE — 4A023N7 MEASUREMENT OF CARDIAC SAMPLING AND PRESSURE, LEFT HEART, PERCUTANEOUS APPROACH: ICD-10-PCS | Performed by: INTERNAL MEDICINE

## 2024-07-29 PROCEDURE — 2500000003 HC RX 250 WO HCPCS: Performed by: INTERNAL MEDICINE

## 2024-07-29 PROCEDURE — 99152 MOD SED SAME PHYS/QHP 5/>YRS: CPT | Performed by: INTERNAL MEDICINE

## 2024-07-29 PROCEDURE — G0378 HOSPITAL OBSERVATION PER HR: HCPCS

## 2024-07-29 PROCEDURE — C1753 CATH, INTRAVAS ULTRASOUND: HCPCS | Performed by: INTERNAL MEDICINE

## 2024-07-29 PROCEDURE — 2580000003 HC RX 258: Performed by: INTERNAL MEDICINE

## 2024-07-29 PROCEDURE — 6360000004 HC RX CONTRAST MEDICATION: Performed by: INTERNAL MEDICINE

## 2024-07-29 PROCEDURE — 7100000011 HC PHASE II RECOVERY - ADDTL 15 MIN: Performed by: INTERNAL MEDICINE

## 2024-07-29 PROCEDURE — C1874 STENT, COATED/COV W/DEL SYS: HCPCS | Performed by: INTERNAL MEDICINE

## 2024-07-29 PROCEDURE — 7100000010 HC PHASE II RECOVERY - FIRST 15 MIN: Performed by: INTERNAL MEDICINE

## 2024-07-29 PROCEDURE — 84132 ASSAY OF SERUM POTASSIUM: CPT

## 2024-07-29 PROCEDURE — B2111ZZ FLUOROSCOPY OF MULTIPLE CORONARY ARTERIES USING LOW OSMOLAR CONTRAST: ICD-10-PCS | Performed by: INTERNAL MEDICINE

## 2024-07-29 PROCEDURE — C1887 CATHETER, GUIDING: HCPCS | Performed by: INTERNAL MEDICINE

## 2024-07-29 PROCEDURE — 92979 ENDOLUMINL IVUS OCT C EA: CPT | Performed by: INTERNAL MEDICINE

## 2024-07-29 PROCEDURE — 6360000002 HC RX W HCPCS: Performed by: INTERNAL MEDICINE

## 2024-07-29 PROCEDURE — C1894 INTRO/SHEATH, NON-LASER: HCPCS | Performed by: INTERNAL MEDICINE

## 2024-07-29 PROCEDURE — 2709999900 HC NON-CHARGEABLE SUPPLY: Performed by: INTERNAL MEDICINE

## 2024-07-29 PROCEDURE — 85049 AUTOMATED PLATELET COUNT: CPT

## 2024-07-29 PROCEDURE — 99153 MOD SED SAME PHYS/QHP EA: CPT | Performed by: INTERNAL MEDICINE

## 2024-07-29 PROCEDURE — 85014 HEMATOCRIT: CPT

## 2024-07-29 PROCEDURE — C1769 GUIDE WIRE: HCPCS | Performed by: INTERNAL MEDICINE

## 2024-07-29 PROCEDURE — 85347 COAGULATION TIME ACTIVATED: CPT

## 2024-07-29 PROCEDURE — C9600 PERC DRUG-EL COR STENT SING: HCPCS | Performed by: INTERNAL MEDICINE

## 2024-07-29 PROCEDURE — 84520 ASSAY OF UREA NITROGEN: CPT

## 2024-07-29 PROCEDURE — 82947 ASSAY GLUCOSE BLOOD QUANT: CPT

## 2024-07-29 PROCEDURE — 82435 ASSAY OF BLOOD CHLORIDE: CPT

## 2024-07-29 PROCEDURE — 027236Z DILATION OF CORONARY ARTERY, THREE ARTERIES WITH THREE DRUG-ELUTING INTRALUMINAL DEVICES, PERCUTANEOUS APPROACH: ICD-10-PCS | Performed by: INTERNAL MEDICINE

## 2024-07-29 PROCEDURE — 84295 ASSAY OF SERUM SODIUM: CPT

## 2024-07-29 DEVICE — STENT ONYXNG35018UX ONYX 3.50X18RX
Type: IMPLANTABLE DEVICE | Status: FUNCTIONAL
Brand: ONYX FRONTIER™

## 2024-07-29 DEVICE — STENT CORONARY ONYX FRONTIER RX 3X22 MM ZOTAROLIMUS ELUT: Type: IMPLANTABLE DEVICE | Status: FUNCTIONAL

## 2024-07-29 DEVICE — STENT CORONARY ONYX FRONTIER RX 3X38 MM ZOTAROLIMUS ELUT: Type: IMPLANTABLE DEVICE | Status: FUNCTIONAL

## 2024-07-29 RX ORDER — CLOPIDOGREL BISULFATE 75 MG/1
75 TABLET ORAL DAILY
Status: DISCONTINUED | OUTPATIENT
Start: 2024-07-30 | End: 2024-07-30 | Stop reason: HOSPADM

## 2024-07-29 RX ORDER — ACETAMINOPHEN 650 MG/1
650 SUPPOSITORY RECTAL EVERY 6 HOURS PRN
Status: DISCONTINUED | OUTPATIENT
Start: 2024-07-29 | End: 2024-07-30 | Stop reason: HOSPADM

## 2024-07-29 RX ORDER — HEPARIN SODIUM 1000 [USP'U]/ML
INJECTION, SOLUTION INTRAVENOUS; SUBCUTANEOUS PRN
Status: DISCONTINUED | OUTPATIENT
Start: 2024-07-29 | End: 2024-07-29 | Stop reason: HOSPADM

## 2024-07-29 RX ORDER — ACETAMINOPHEN 325 MG/1
650 TABLET ORAL EVERY 6 HOURS PRN
Status: DISCONTINUED | OUTPATIENT
Start: 2024-07-29 | End: 2024-07-30 | Stop reason: HOSPADM

## 2024-07-29 RX ORDER — ZOLPIDEM TARTRATE 5 MG/1
5 TABLET ORAL NIGHTLY
Status: DISCONTINUED | OUTPATIENT
Start: 2024-07-29 | End: 2024-07-30 | Stop reason: HOSPADM

## 2024-07-29 RX ORDER — ONDANSETRON 4 MG/1
4 TABLET, ORALLY DISINTEGRATING ORAL EVERY 8 HOURS PRN
Status: DISCONTINUED | OUTPATIENT
Start: 2024-07-29 | End: 2024-07-30 | Stop reason: HOSPADM

## 2024-07-29 RX ORDER — CLOPIDOGREL 300 MG/1
TABLET, FILM COATED ORAL PRN
Status: DISCONTINUED | OUTPATIENT
Start: 2024-07-29 | End: 2024-07-29 | Stop reason: HOSPADM

## 2024-07-29 RX ORDER — SODIUM CHLORIDE 0.9 % (FLUSH) 0.9 %
5-40 SYRINGE (ML) INJECTION PRN
Status: DISCONTINUED | OUTPATIENT
Start: 2024-07-29 | End: 2024-07-30 | Stop reason: HOSPADM

## 2024-07-29 RX ORDER — MAGNESIUM SULFATE IN WATER 40 MG/ML
2000 INJECTION, SOLUTION INTRAVENOUS PRN
Status: DISCONTINUED | OUTPATIENT
Start: 2024-07-29 | End: 2024-07-30 | Stop reason: HOSPADM

## 2024-07-29 RX ORDER — FENTANYL CITRATE 50 UG/ML
25 INJECTION, SOLUTION INTRAMUSCULAR; INTRAVENOUS EVERY 6 HOURS PRN
Status: DISCONTINUED | OUTPATIENT
Start: 2024-07-29 | End: 2024-07-30 | Stop reason: HOSPADM

## 2024-07-29 RX ORDER — SODIUM CHLORIDE 9 MG/ML
INJECTION, SOLUTION INTRAVENOUS PRN
Status: DISCONTINUED | OUTPATIENT
Start: 2024-07-29 | End: 2024-07-30 | Stop reason: HOSPADM

## 2024-07-29 RX ORDER — SODIUM CHLORIDE 0.9 % (FLUSH) 0.9 %
5-40 SYRINGE (ML) INJECTION EVERY 12 HOURS SCHEDULED
Status: DISCONTINUED | OUTPATIENT
Start: 2024-07-29 | End: 2024-07-30 | Stop reason: HOSPADM

## 2024-07-29 RX ORDER — MIDAZOLAM HYDROCHLORIDE 1 MG/ML
INJECTION INTRAMUSCULAR; INTRAVENOUS PRN
Status: DISCONTINUED | OUTPATIENT
Start: 2024-07-29 | End: 2024-07-29 | Stop reason: HOSPADM

## 2024-07-29 RX ORDER — SODIUM CHLORIDE 9 MG/ML
INJECTION, SOLUTION INTRAVENOUS CONTINUOUS
Status: DISCONTINUED | OUTPATIENT
Start: 2024-07-29 | End: 2024-07-29

## 2024-07-29 RX ORDER — POTASSIUM CHLORIDE 20 MEQ/1
40 TABLET, EXTENDED RELEASE ORAL PRN
Status: DISCONTINUED | OUTPATIENT
Start: 2024-07-29 | End: 2024-07-30 | Stop reason: HOSPADM

## 2024-07-29 RX ORDER — ONDANSETRON 2 MG/ML
4 INJECTION INTRAMUSCULAR; INTRAVENOUS EVERY 6 HOURS PRN
Status: DISCONTINUED | OUTPATIENT
Start: 2024-07-29 | End: 2024-07-30 | Stop reason: HOSPADM

## 2024-07-29 RX ORDER — ACETAMINOPHEN 325 MG/1
650 TABLET ORAL EVERY 4 HOURS PRN
Status: DISCONTINUED | OUTPATIENT
Start: 2024-07-29 | End: 2024-07-30 | Stop reason: HOSPADM

## 2024-07-29 RX ORDER — NITROGLYCERIN 20 MG/100ML
INJECTION INTRAVENOUS PRN
Status: DISCONTINUED | OUTPATIENT
Start: 2024-07-29 | End: 2024-07-29 | Stop reason: HOSPADM

## 2024-07-29 RX ORDER — LIDOCAINE HYDROCHLORIDE 10 MG/ML
INJECTION, SOLUTION INFILTRATION; PERINEURAL PRN
Status: DISCONTINUED | OUTPATIENT
Start: 2024-07-29 | End: 2024-07-29 | Stop reason: HOSPADM

## 2024-07-29 RX ORDER — LORAZEPAM 0.5 MG/1
1 TABLET ORAL DAILY PRN
Status: DISCONTINUED | OUTPATIENT
Start: 2024-07-29 | End: 2024-07-30 | Stop reason: HOSPADM

## 2024-07-29 RX ORDER — ASPIRIN 81 MG/1
TABLET, CHEWABLE ORAL PRN
Status: DISCONTINUED | OUTPATIENT
Start: 2024-07-29 | End: 2024-07-29 | Stop reason: HOSPADM

## 2024-07-29 RX ORDER — POTASSIUM CHLORIDE 7.45 MG/ML
10 INJECTION INTRAVENOUS PRN
Status: DISCONTINUED | OUTPATIENT
Start: 2024-07-29 | End: 2024-07-30 | Stop reason: HOSPADM

## 2024-07-29 RX ORDER — SODIUM CHLORIDE 9 MG/ML
INJECTION, SOLUTION INTRAVENOUS CONTINUOUS
Status: DISCONTINUED | OUTPATIENT
Start: 2024-07-29 | End: 2024-07-30 | Stop reason: HOSPADM

## 2024-07-29 RX ORDER — ASPIRIN 81 MG/1
81 TABLET ORAL DAILY
Status: DISCONTINUED | OUTPATIENT
Start: 2024-07-30 | End: 2024-07-30 | Stop reason: HOSPADM

## 2024-07-29 RX ORDER — POLYETHYLENE GLYCOL 3350 17 G/17G
17 POWDER, FOR SOLUTION ORAL DAILY PRN
Status: DISCONTINUED | OUTPATIENT
Start: 2024-07-29 | End: 2024-07-30 | Stop reason: HOSPADM

## 2024-07-29 RX ADMIN — ACETAMINOPHEN 650 MG: 325 TABLET ORAL at 18:55

## 2024-07-29 RX ADMIN — SODIUM CHLORIDE, PRESERVATIVE FREE 10 ML: 5 INJECTION INTRAVENOUS at 21:13

## 2024-07-29 RX ADMIN — ZOLPIDEM TARTRATE 5 MG: 5 TABLET, COATED ORAL at 21:08

## 2024-07-29 RX ADMIN — SODIUM CHLORIDE: 9 INJECTION, SOLUTION INTRAVENOUS at 14:34

## 2024-07-29 RX ADMIN — SODIUM CHLORIDE: 9 INJECTION, SOLUTION INTRAVENOUS at 12:19

## 2024-07-29 ASSESSMENT — PAIN SCALES - GENERAL: PAINLEVEL_OUTOF10: 3

## 2024-07-29 ASSESSMENT — PAIN DESCRIPTION - DESCRIPTORS: DESCRIPTORS: ACHING

## 2024-07-29 ASSESSMENT — PAIN DESCRIPTION - LOCATION: LOCATION: WRIST

## 2024-07-29 ASSESSMENT — PAIN DESCRIPTION - ORIENTATION: ORIENTATION: RIGHT

## 2024-07-29 NOTE — BRIEF OP NOTE
Brief Postoperative Note      Patient: Ata Waller  YOB: 1950  MRN: [id number]    Date of Procedure: 7/29/2024    Pre-Op Diagnosis Codes:     * Abnormal stress ECG [R94.39]  Recurrent chest pain     Post-Op Diagnosis: Same       Procedure(s):  Left heart cath / coronary angiography  Percutaneous coronary intervention    Surgeon(s):  Konrad Atwood MD      Anesthesia: IV Sedation    Estimated Blood Loss (mL): Minimal    Complications: None    Specimens:   * No specimens in log *    Implants:  Implant Name Type Inv. Item Serial No.  Lot No. LRB No. Used Action   STENT CORONARY SUNG FRONTIER RX 3X38 MM ZOTAROLIMUS ELUT - [id number] Coronary stents STENT CORONARY SUNG FRONTIER RX 3X38 MM ZOTAROLIMUS ELUT  [employer]- [id number] N/A 1 Implanted   STENT CORONARY SUNG FRONTIER RX 3.5X18 MM ZOTAROLIMUS ELUT - [id number] Coronary stents STENT CORONARY SUNG FRONTIER RX 3.5X18 MM ZOTAROLIMUS ELUT  [employer]-WD [id number] N/A 1 Implanted   STENT CORONARY SUNG FRONTIER RX 3X22 MM ZOTAROLIMUS ELUT - [id number] Coronary stents STENT CORONARY SUNG FRONTIER RX 3X22 MM ZOTAROLIMUS ELUT  [employer]-WD [id number] N/A 1 Implanted         Findings:    Two vessel coronary artery disease  Successful PTCA/FRANKIE of mid LAD  Successful PTCA/FRANKIE of proximal OM  Normal LV systolic function    Plan:   DAPT with asa and plavix  High intensity statin  Cardiac rehab  Observe overnight and DC in am      Electronically signed by Konrad Atwood MD on 7/29/2024 at 2:07 PM

## 2024-07-29 NOTE — H&P
Cardiac Catheterization Lab          Date:   7/29/2024  Patient name: Ata Waller  Date of admission:  7/29/2024 11:39 AM  MRN:   [id number]  YOB: 1950    Reason for Admission: Elective Coronary angiography with possible PCI     CHIEF COMPLAINT:  73 y.o. y/o male presented with chest pain and abnormal stress test     [X] Please see my office consult note for detailed H and P in the chart    [X]  No changes in the information noted from the time of evaluation    Past Medical History:   has a past medical history of ED (erectile dysfunction), Elevated PSA, and Prostate cancer (HCC).    Past Surgical History:   has a past surgical history that includes Prostatectomy; Abdominal hernia repair; and joint replacement.     Home Medications:    Prior to Admission medications    Medication Sig Start Date End Date Taking? Authorizing Provider   aspirin 81 MG EC tablet Take 1 tablet by mouth daily 7/19/24   Konrad Atwood MD   clobetasol (TEMOVATE) 0.05 % cream Apply to rash twice daily, avoiding face and skin folds 4/12/24   Hilda Marshall MD   LORazepam (ATIVAN) 0.5 MG tablet Take 2 tablets by mouth daily as needed for Anxiety. Max Daily Amount: 1 mg 3/7/24 4/6/25  Cathy Romero MD   zolpidem (AMBIEN) 5 MG tablet Take 1 tablet by mouth daily. Max Daily Amount: 5 mg 3/7/24 4/6/25  Cathy Romero MD   betamethasone dipropionate 0.05 % cream APPLY TOPICALLY TO AFFECTED AREA TWICE DAILY 3/4/24 3/4/25  Cathy Romero MD   triamcinolone (KENALOG) 0.1 % cream Apply to rash twice daily (not face, armpit or groin) 2/6/24   Hilda Marshall MD   LORAZEPAM PO     ProviderYokasta MD   Multiple Vitamins-Minerals (THERAPEUTIC MULTIVITAMIN-MINERALS) tablet Take 1 tablet by mouth daily    Yokasta Guerra MD   PAPAVERINE-PHENTOLAMINE IC by [employer]CAVernosal

## 2024-07-29 NOTE — PROGRESS NOTES
Nephrology Progress Note        [address], [address] [address]  Phone: [phone]  Office Hours: 8:30AM - 4:30PM  Monday - Friday 11/14/2022 7:43 AM  Subjective:   Admit Date: 11/12/2022  PCP: Luly Cruz MD  Interval History:   On room air    Diet: ADULT DIET; Regular; Low Sodium (2 gm); 2000 ml      Data:   Scheduled Meds:   aspirin  81 mg Oral Daily    midodrine  5 mg Oral TID WC    octreotide  100 mcg SubCUTAneous Q8H    albuterol sulfate HFA  2 puff Inhalation 4x daily    DULoxetine  60 mg Oral Daily    furosemide  40 mg Oral BID    levothyroxine  50 mcg Oral Daily    nadolol  40 mg Oral Nightly    spironolactone  100 mg Oral BID    sodium chloride flush  5-40 mL IntraVENous 2 times per day    cefTRIAXone (ROCEPHIN) IV  1,000 mg IntraVENous Q24H    lactulose  30 g Oral TID    enoxaparin  40 mg SubCUTAneous Daily    pantoprazole  40 mg Oral QAM AC    rifAXIMin  400 mg Oral TID    omega-3 acid ethyl esters  2 g Oral Daily     Continuous Infusions:   sodium chloride       PRN Meds:sodium chloride flush, sodium chloride, ondansetron **OR** ondansetron, polyethylene glycol  I/O last 3 completed shifts: In: 1050 [P.O.:1040; I.V.:10]  Out: 800 [Urine:800]  No intake/output data recorded. Intake/Output Summary (Last 24 hours) at 11/14/2022 0743  Last data filed at 11/14/2022 0015  Gross per 24 hour   Intake 810 ml   Output 800 ml   Net 10 ml       CBC:   Recent Labs     11/12/22 1420 11/13/22  0612   WBC 7.5 7.2   HGB 12.4* 12.3*    182       BMP:    Recent Labs     11/12/22  1420 11/13/22  0612    140   K 4.5 4.2   CL 97* 99   CO2 29 31   BUN 35* 32*   CREATININE 1.9* 1.7*   GLUCOSE 112* 116*     Hepatic:   Recent Labs     11/12/22 1420 11/13/22  0612   AST 40* 40*   ALT 23 24   BILITOT 0.9 0.9   ALKPHOS 96 101     Troponin: No results for input(s): TROPONINI in the last 72 hours. BNP: No results for input(s): BNP in the last 72 hours.   Lipids: No Patient and all belongings taken u pto room 320.  Wife taken to room 320 with patient.   results for input(s): CHOL, HDL in the last 72 hours.     Invalid input(s): LDLCALCU  ABGs:   Lab Results   Component Value Date/Time    PO2ART 73 06/23/2022 11:00 AM    TBW1RLM 63.0 06/23/2022 11:00 AM     INR:   Recent Labs     11/13/22 2155   INR 1.01       Objective:   Vitals: BP 98/85   Pulse 80   Temp 98 °F (36.7 °C) (Oral)   Resp 18   Ht 5' 8\" (1.727 m)   Wt 218 lb 14.7 oz (99.3 kg)   SpO2 92%   BMI 33.29 kg/m²   General appearance: alert and cooperative with exam, in no acute distress  HEENT: normocephalic, atraumatic,   Neck: supple, trachea midline  Lungs: clear to auscultation bilaterally, breathing comfortably on room air  Heart[de-identified] regular rate and rhythm, S1, S2 normal,  Abdomen: soft, non-tender; non distended, +bowel sounds  Extremities: extremities atraumatic, no cyanosis or edema  Neurologic: alert, oriented, follows commands, interactive    MEDICAL DECISION MAKING     Patient Active Problem List    Diagnosis Date Noted    AMS (altered mental status) 11/12/2022    Hypoxia 11/07/2022    Pleural effusion 10/04/2022    Recurrent pleural effusion on right 09/09/2022    Hypersomnia 08/15/2022    Ex-smoker 08/15/2022    Obesity (BMI 30-39.9) 08/15/2022    Type 2 diabetes mellitus with chronic kidney disease 07/12/2022    Thyroid disease 06/21/2022    Cellulitis of left lower extremity 06/08/2022    Type 2 diabetes mellitus without complication, without long-term current use of insulin (HCC) 06/08/2022    Generalized weakness     Oropharyngeal dysphagia     Acute kidney injury superimposed on chronic kidney disease (HCC)     Acute respiratory failure ([address] 05/04/2022    Recurrent right pleural effusion 05/02/2022    Sepsis ([address] 03/19/2022    Hepatic encephalopathy 03/19/2022    Hearing loss 03/11/2022    Cirrhosis of liver with ascites ([address]     Secondary esophageal varices without bleeding ([address]     Gastric polyps     SOB (shortness of breath) 09/13/2021    Portal hypertension ([address] 08/30/2021 JENNIFER (obstructive sleep apnea) 08/09/2021    Bilateral hearing loss 05/07/2021    Increased ammonia level 05/07/2021    Class 3 severe obesity with body mass index (BMI) of 40.0 to 44.9 in adult ([facility] 09/21/2020    Hyperglycemia 12/04/2019    Acquired hypothyroidism 12/04/2019    Pulmonary nodules 09/09/2019    Ascites due to alcoholic cirrhosis ([facility] 03/85/3671    Mixed hyperlipidemia 08/08/2019    Hypertension 08/08/2019    History of alcohol abuse 08/08/2019    Gastroesophageal reflux disease 08/08/2019    Anxiety 08/08/2019    Shortness of breath 05/26/2019    History of colonic polyps 01/15/2019     -Cr 1.7  -As mentioned at previous admissions, he is better off getting tap'ed for the pleural effusions or ascites vs having him on high does diuretics that will end up resulting in more volume depletion and then CANDIE.   It would be best to avoid dialysis in him, considering all his other medical problems    -Avoid nephrotoxins    Thank you                  Electronically signed by Tyroen Martinez DO on 11/14/2022 at 7:43 AM    [facility]  MD Shant Shields DO  [address],  [address]  [place], [place] [address]  PHONE: [phone]  FAX: [phone]

## 2024-07-30 VITALS
SYSTOLIC BLOOD PRESSURE: 132 MMHG | TEMPERATURE: 98.1 F | BODY MASS INDEX: 24.72 KG/M2 | RESPIRATION RATE: 17 BRPM | HEART RATE: 58 BPM | OXYGEN SATURATION: 99 % | HEIGHT: 72 IN | WEIGHT: 182.54 LBS | DIASTOLIC BLOOD PRESSURE: 70 MMHG

## 2024-07-30 LAB
ANION GAP SERPL CALCULATED.3IONS-SCNC: 8 MMOL/L (ref 9–17)
BUN SERPL-MCNC: 28 MG/DL (ref 8–23)
CALCIUM SERPL-MCNC: 8.9 MG/DL (ref 8.6–10.4)
CHLORIDE SERPL-SCNC: 109 MMOL/L (ref 98–107)
CO2 SERPL-SCNC: 24 MMOL/L (ref 20–31)
CREAT SERPL-MCNC: 0.9 MG/DL (ref 0.7–1.2)
ERYTHROCYTE [DISTWIDTH] IN BLOOD BY AUTOMATED COUNT: 12.9 % (ref 12.5–15.4)
GFR, ESTIMATED: 89 ML/MIN/1.73M2
GLUCOSE SERPL-MCNC: 101 MG/DL (ref 70–99)
HCT VFR BLD AUTO: 39.5 % (ref 41–53)
HGB BLD-MCNC: 13.6 G/DL (ref 13.5–17.5)
MCH RBC QN AUTO: 29.8 PG (ref 26–34)
MCHC RBC AUTO-ENTMCNC: 34.4 G/DL (ref 31–37)
MCV RBC AUTO: 86.8 FL (ref 80–100)
PLATELET # BLD AUTO: 169 K/UL (ref 140–450)
PMV BLD AUTO: 7 FL (ref 6–12)
POTASSIUM SERPL-SCNC: 4.6 MMOL/L (ref 3.7–5.3)
RBC # BLD AUTO: 4.55 M/UL (ref 4.5–5.9)
SODIUM SERPL-SCNC: 141 MMOL/L (ref 135–144)
WBC OTHER # BLD: 9.1 K/UL (ref 3.5–11)

## 2024-07-30 PROCEDURE — 2060000000 HC ICU INTERMEDIATE R&B

## 2024-07-30 PROCEDURE — G0378 HOSPITAL OBSERVATION PER HR: HCPCS

## 2024-07-30 PROCEDURE — 85027 COMPLETE CBC AUTOMATED: CPT

## 2024-07-30 PROCEDURE — 6370000000 HC RX 637 (ALT 250 FOR IP): Performed by: INTERNAL MEDICINE

## 2024-07-30 PROCEDURE — 80048 BASIC METABOLIC PNL TOTAL CA: CPT

## 2024-07-30 PROCEDURE — 99238 HOSP IP/OBS DSCHRG MGMT 30/<: CPT | Performed by: INTERNAL MEDICINE

## 2024-07-30 PROCEDURE — 2580000003 HC RX 258: Performed by: INTERNAL MEDICINE

## 2024-07-30 PROCEDURE — 36415 COLL VENOUS BLD VENIPUNCTURE: CPT

## 2024-07-30 RX ORDER — ATORVASTATIN CALCIUM 20 MG/1
20 TABLET, FILM COATED ORAL DAILY
Qty: 90 TABLET | Refills: 1 | Status: SHIPPED | OUTPATIENT
Start: 2024-07-30

## 2024-07-30 RX ORDER — CLOPIDOGREL BISULFATE 75 MG/1
75 TABLET ORAL DAILY
Qty: 30 TABLET | Refills: 5 | Status: SHIPPED | OUTPATIENT
Start: 2024-07-31

## 2024-07-30 RX ADMIN — ASPIRIN 81 MG: 81 TABLET, COATED ORAL at 08:30

## 2024-07-30 RX ADMIN — ACETAMINOPHEN 650 MG: 325 TABLET ORAL at 05:34

## 2024-07-30 RX ADMIN — CLOPIDOGREL BISULFATE 75 MG: 75 TABLET ORAL at 08:30

## 2024-07-30 RX ADMIN — SODIUM CHLORIDE: 9 INJECTION, SOLUTION INTRAVENOUS at 05:32

## 2024-07-30 ASSESSMENT — PAIN DESCRIPTION - ORIENTATION: ORIENTATION: RIGHT

## 2024-07-30 ASSESSMENT — PAIN DESCRIPTION - LOCATION: LOCATION: ARM

## 2024-07-30 ASSESSMENT — PAIN SCALES - GENERAL: PAINLEVEL_OUTOF10: 4

## 2024-07-30 ASSESSMENT — PAIN DESCRIPTION - DESCRIPTORS: DESCRIPTORS: TENDER

## 2024-07-30 NOTE — DISCHARGE SUMMARY
Cardiology Discharge Note         Name:  Ata Waller    YOB: 1950    Medical Record Number:  [id number]    Date of Admission:  7/29/2024    Date of Discharge:  7/30/2024    Admitting physician: Konrad Atwood MD    Discharge Attending: Konrad Atwood MD, MD    Primary Care Physician: Cathy Romero MD    Consultants: none     Discharge to home    Discharge condition stable    HOSPITAL ADMISSION PROBLEM LIST:  Patient Active Problem List   Diagnosis    Erectile dysfunction after radical prostatectomy    History of prostate cancer    Nocturia    History of kidney stones    Primary osteoarthritis of left hip    S/P total left hip arthroplasty    Tubular adenoma of colon    Polyp of colon    AVM (arteriovenous malformation) of colon    Diverticulosis    Sleep disturbance    Adjustment disorder with anxiety    Primary osteoarthritis of right hip    Hyperlipidemia    S/P cardiac cath    S/P PTCA (percutaneous transluminal coronary angioplasty)         HOSPITAL COURSE :  The patient was admitted for: Elective coronary angiography for abnormal stress test and chest pain.  Underwent left heart cath which showed two-vessel coronary artery disease status post successful PCI to mid LAD and proximal OM.  Post op course was uncomplicated.   Today patient denies any chest pain and dyspnea  Remains hemodynamically stable throughout     Discharge exam:   Vitals:    07/30/24 1119   BP: 132/70   Pulse: 58   Resp:    Temp: 98.1 °F (36.7 °C)   SpO2: 99%     Neuro: Alert and oriented   Chest: Clear to ausculation. No basilar rales or  wheezing.  Cardiac: RRR, no new murmur or gallop   Abdomen/groin: soft, non-tender, without masses or organomegaly  Lower extremity edema: none  Cardiac cath access site: soft, with no hematoma or bleeding/oozing        Discharge Medications:       Medication List        START taking these medications      atorvastatin 20 MG tablet  Commonly known as:  LIPITOR  Take 1 tablet by mouth daily     clopidogrel 75 MG tablet  Commonly known as: PLAVIX  Take 1 tablet by mouth daily  Start taking on: July 31, 2024            CHANGE how you take these medications      LORazepam 0.5 MG tablet  Commonly known as: ATIVAN  Take 2 tablets by mouth daily as needed for Anxiety. Max Daily Amount: 1 mg  What changed: Another medication with the same name was removed. Continue taking this medication, and follow the directions you see here.            CONTINUE taking these medications      aspirin 81 MG EC tablet  Take 1 tablet by mouth daily     betamethasone dipropionate 0.05 % cream  APPLY TOPICALLY TO AFFECTED AREA TWICE DAILY     clobetasol 0.05 % cream  Commonly known as: TEMOVATE  Apply to rash twice daily, avoiding face and skin folds     PAPAVERINE-PHENTOLAMINE IC     therapeutic multivitamin-minerals tablet     triamcinolone 0.1 % cream  Commonly known as: KENALOG  Apply to rash twice daily (not face, armpit or groin)     zolpidem 5 MG tablet  Commonly known as: AMBIEN  Take 1 tablet by mouth daily. Max Daily Amount: 5 mg               Where to Get Your Medications        These medications were sent to [id number] - [place], [place] - [address] - P [phone] - F [phone] [address] [facility] [address]      Phone: [phone]   atorvastatin 20 MG tablet  clopidogrel 75 MG tablet            Post PCI discharge medications :     Given = X    ASA x   Antiplatelet agent (Plavix/Effient/Brilinta) x   Statin x   Beta Blocker  Not on BB due to resting bradycardia      The patient is to follow up with primary care provider in 1-2 week    Routine Follow up with cardiology in 3-4 weeks    Medications, follow up and discharge instructions including importance of medication compliance were extensively discussed in detail with the patient and nursing.    Konrad Atwood MD    [facility] Cardiology

## 2024-07-30 NOTE — PROGRESS NOTES
Discharge instructions reviewed with patient and wife, all questions answered. Patient discharged with all belongings via private vehicle.

## 2024-07-30 NOTE — PLAN OF CARE
Problem: Discharge Planning  Goal: Discharge to home or other facility with appropriate resources  Outcome: Progressing  Flowsheets  Taken 7/30/2024 0411  Discharge to home or other facility with appropriate resources:   Identify barriers to discharge with patient and caregiver   Arrange for needed discharge resources and transportation as appropriate   Identify discharge learning needs (meds, wound care, etc)   Refer to discharge planning if patient needs post-hospital services based on physician order or complex needs related to functional status, cognitive ability or social support system  Taken 7/30/2024 0000  Discharge to home or other facility with appropriate resources:   Identify barriers to discharge with patient and caregiver   Arrange for needed discharge resources and transportation as appropriate   Identify discharge learning needs (meds, wound care, etc)   Refer to discharge planning if patient needs post-hospital services based on physician order or complex needs related to functional status, cognitive ability or social support system  Taken 7/29/2024 2115  Discharge to home or other facility with appropriate resources:   Identify barriers to discharge with patient and caregiver   Arrange for needed discharge resources and transportation as appropriate   Identify discharge learning needs (meds, wound care, etc)   Refer to discharge planning if patient needs post-hospital services based on physician order or complex needs related to functional status, cognitive ability or social support system     Problem: Safety - Adult  Goal: Free from fall injury  Outcome: Progressing     Problem: Pain  Goal: Verbalizes/displays adequate comfort level or baseline comfort level  Outcome: Progressing  Flowsheets (Taken 7/30/2024 0534)  Verbalizes/displays adequate comfort level or baseline comfort level:   Encourage patient to monitor pain and request assistance   Assess pain using appropriate pain scale

## 2024-07-30 NOTE — PLAN OF CARE
Problem: Discharge Planning  Goal: Discharge to home or other facility with appropriate resources  7/30/2024 1126 by Sandra Barragan RN  Outcome: Completed  7/30/2024 0601 by Melissa Gtz, RN  Outcome: Progressing  Flowsheets  Taken 7/30/2024 0411  Discharge to home or other facility with appropriate resources:   Identify barriers to discharge with patient and caregiver   Arrange for needed discharge resources and transportation as appropriate   Identify discharge learning needs (meds, wound care, etc)   Refer to discharge planning if patient needs post-hospital services based on physician order or complex needs related to functional status, cognitive ability or social support system  Taken 7/30/2024 0000  Discharge to home or other facility with appropriate resources:   Identify barriers to discharge with patient and caregiver   Arrange for needed discharge resources and transportation as appropriate   Identify discharge learning needs (meds, wound care, etc)   Refer to discharge planning if patient needs post-hospital services based on physician order or complex needs related to functional status, cognitive ability or social support system  Taken 7/29/2024 2115  Discharge to home or other facility with appropriate resources:   Identify barriers to discharge with patient and caregiver   Arrange for needed discharge resources and transportation as appropriate   Identify discharge learning needs (meds, wound care, etc)   Refer to discharge planning if patient needs post-hospital services based on physician order or complex needs related to functional status, cognitive ability or social support system     Problem: Safety - Adult  Goal: Free from fall injury  7/30/2024 1126 by Sandra Barragan, RN  Outcome: Completed  7/30/2024 0601 by Melissa Gtz, RN  Outcome: Progressing     Problem: Pain  Goal: Verbalizes/displays adequate comfort level or baseline comfort level  7/30/2024 1126 by Sandra Barragan, TANIA  Outcome:

## 2024-07-31 ENCOUNTER — CARE COORDINATION (OUTPATIENT)
Dept: CARE COORDINATION | Age: 74
End: 2024-07-31

## 2024-07-31 LAB — ECHO BSA: 2 M2

## 2024-07-31 NOTE — CARE COORDINATION
Care Transitions Note    Initial Call Attempt #1- Call within 2 business days of discharge: Yes    Attempted to reach patient for transitions of care follow up. Unable to reach patient.    Outreach Attempts:   HIPAA compliant voicemail left for patient.     Patient: Ata Waller      Patient : 1950   MRN: [id number]      Reason for Admission: scheduled cath/PCI for LAD + Cx stents, abnormal stress test  Discharge Date: 24    RURS: Readmission Risk Score: 5.4    - OBS admission for scheduled heart cath, abnormal stress test.  LAD + Cx stents - started on plavix + lipitor    Last Discharge Facility       Date Complaint Diagnosis Description Type Department Provider    24  S/P PTCA (percutaneous transluminal coronary angioplasty) ... Admission (Discharged) [facility] Konrad Kenney MD            Was this an external facility discharge? No    Follow Up Appointment:   Patient has hospital follow up appointment scheduled  routed to schedule HFU     Future Appointments         Provider Specialty Dept Phone    2024 3:00 PM Cathy Romero MD Family Medicine [phone]    10/16/2024 2:15 PM Hilda Marshall MD Dermatology [phone]            Plan for follow-up on next business day.      Gayla Moura RN

## 2024-07-31 NOTE — CARE COORDINATION
Care Transitions Note    Initial Call - Call within 2 business days of discharge: Yes  Incoming call returned by patient to CTN    Patient Current Location:  Home: [address] [place] [address]    Care Transition Nurse contacted the patient by telephone to perform post hospital discharge assessment, verified name and  as identifiers. Provided introduction to self, and explanation of the Care Transition Nurse role.     Patient: Ata Waller                             Patient : 1950   MRN: [id number]                               Reason for Admission: scheduled cath/PCI for LAD + Cx stents, abnormal stress test  Discharge Date: 24         RURS: Readmission Risk Score: 5.4     - OBS admission for scheduled heart cath, abnormal stress test.  LAD + Cx stents - started on plavix + lipitor         Last Discharge Facility       Date Complaint Diagnosis Description Type Department Provider    24  S/P PTCA (percutaneous transluminal coronary angioplasty) ... Admission (Discharged) [facility] Konrad Kenney MD            Was this an external facility discharge? No    Additional needs identified to be addressed with provider   Contacted cardio for appt -  3:30             Method of communication with provider: phone to schedule cardio appt - routed to PCP clinical support for PCP HFU.    Patients top risk factors for readmission: medical condition-recent stents    Interventions to address risk factors:   Review of patient management of conditions/medications: reviewed    Care Summary Note:   - OBS admission for scheduled heart cath, abnormal stress test.  LAD + Cx stents - started on plavix + lipitor.    Ata returned call to CTN - he reports that he is doing \"better now\" post stents.  Feels as if he has more energy and more mental clarity since procedure.  Up and around - patient is overall physically active with golf, tennis.  Used to take liquid IV with exercise until he

## 2024-08-01 NOTE — TELEPHONE ENCOUNTER
LM for patient to schedule hosp follow up. Scheduled procedure  Use hosp slot, call already completed

## 2024-08-07 ENCOUNTER — CARE COORDINATION (OUTPATIENT)
Dept: CARE COORDINATION | Age: 74
End: 2024-08-07

## 2024-08-07 NOTE — CARE COORDINATION
.Care Transitions Note    Follow Up Call     Attempted to reach patient for transitions of care follow up.  Unable to reach patient.      Outreach Attempts:   HIPAA compliant voicemail left for patient.     Care Summary Note: 1st attempt    Follow Up Appointment:   Future Appointments         Provider Specialty Dept Phone    8/23/2024 3:00 PM Cathy Romero MD Family Medicine [phone]    8/29/2024 3:30 PM Konrad Atwood MD Cardiology [phone]    9/6/2024 12:30 PM Cathy Romero MD Family Medicine [phone]    10/16/2024 2:15 PM Hilda Marshall MD Dermatology [phone]            Plan for follow-up call in 2-5 days based on severity of symptoms and risk factors. Plan for next call: check any chest discomfort post stent or any issues  medication management-check tolerance of lipitor and plavix    Isabelle Means LPN

## 2024-08-09 ENCOUNTER — CARE COORDINATION (OUTPATIENT)
Dept: CARE COORDINATION | Age: 74
End: 2024-08-09

## 2024-08-09 NOTE — CARE COORDINATION
Care Transitions Note    Follow Up Call     Attempted to reach patient for transitions of care follow up.  Unable to reach patient.      Outreach Attempts: # 2   HIPAA compliant voicemail left for patient.     Care Summary Note: Left HIPAA compliant VM requesting a return call. Will resolve episode if no return call. 2nd attempt     Follow Up Appointment:   Future Appointments         Provider Specialty Dept Phone    8/23/2024 3:00 PM Cathy Romero MD Family Medicine [phone]    8/29/2024 3:30 PM Konrad Atwood MD Cardiology [phone]    9/6/2024 12:30 PM Cathy Romero MD Family Medicine [phone]    10/16/2024 2:15 PM Hilda Marshall MD Dermatology [phone]            No further follow-up call indicated based on severity of symptoms and risk factors.     Silva Rivera LPN       
Writer attempted to reach pt for transitional care call returning his call 2nd attempt left HIPAA compliant VM requesting a return call.  Silva Rose LPN   Care Transitions Nurse  Cell    
have any active services?: Yes   Post Acute Services: Other         Do you have any needs or concerns that I can assist you with?: No   Identified Barriers: None             Follow Up Appointment:   Reviewed upcoming appointment(s).  Future Appointments         Provider Specialty Dept Phone    8/23/2024 3:00 PM Cathy Romero MD Family Medicine [phone]    8/29/2024 3:30 PM Konrad Atwood MD Cardiology [phone]    9/6/2024 12:30 PM Cathy Romero MD Family Medicine [phone]    10/16/2024 2:15 PM Hilda Marshall MD Dermatology [phone]            LPN Care Coordinator provided contact information.  Plan for follow-up call in 2-5 days based on severity of symptoms and risk factors.  Plan for next call: symptom management-How is back ? Any more issues with muscle pain?       Silva Rivera LPN

## 2024-08-12 ENCOUNTER — TELEPHONE (OUTPATIENT)
Age: 74
End: 2024-08-12

## 2024-08-12 NOTE — TELEPHONE ENCOUNTER
Called patient and left voice mail in regard to 8/29 follow up appt with Dr. Awtood being switched to Jaylyn Rodriguez.     Left office phone number for any questions/concerns.

## 2024-08-12 NOTE — TELEPHONE ENCOUNTER
Patient called requesting switch with statin.  States he is currently taking 20mg daily but is experiencing muscle aches.

## 2024-08-14 ENCOUNTER — CARE COORDINATION (OUTPATIENT)
Dept: CARE COORDINATION | Age: 74
End: 2024-08-14

## 2024-08-14 NOTE — CARE COORDINATION
Care Transitions Note    Follow Up Call     Attempted to reach patient for transitions of care follow up.  Unable to reach patient.      Outreach Attempts:   HIPAA compliant voicemail left for patient.     Care Summary Note: 1st attempt    Follow Up Appointment:   Future Appointments         Provider Specialty Dept Phone    8/23/2024 3:00 PM Cathy Romero MD Family Medicine [phone]    8/29/2024 3:30 PM Jaylyn Rodriguez APRN - CNP Cardiology [phone]    9/6/2024 12:30 PM Cathy Romero MD Family Medicine [phone]    10/16/2024 2:15 PM Hilda Marshall MD Dermatology [phone]            Plan for follow-up call in 2-5 days based on severity of symptoms and risk factors. Plan for next call: How is back ? Any more issues with muscle pain?       Isabelle Means LPN

## 2024-08-16 ENCOUNTER — CARE COORDINATION (OUTPATIENT)
Dept: CARE COORDINATION | Age: 74
End: 2024-08-16

## 2024-08-16 RX ORDER — ATORVASTATIN CALCIUM 10 MG/1
10 TABLET, FILM COATED ORAL DAILY
Qty: 30 TABLET | Refills: 3 | Status: SHIPPED | OUTPATIENT
Start: 2024-08-16

## 2024-08-16 NOTE — CARE COORDINATION
Care Transitions Note    Follow Up Call     Attempted to reach patient for transitions of care follow up.  Unable to reach patient.      Outreach Attempts: # 2   HIPAA compliant voicemail left for patient.     Care Summary Note: left HIPAA compliant VM requesting a return call. Will resolve episode if no return call 2nd attempt.     Follow Up Appointment:   Future Appointments         Provider Specialty Dept Phone    8/29/2024 3:30 PM Jaylyn Rodriguez APRN - CNP Cardiology [phone]    9/6/2024 12:30 PM Cathy Romero MD Family Medicine [phone]    10/16/2024 2:15 PM Hilda Marshall MD Dermatology [phone]            No further follow-up call indicated based on severity of symptoms and risk factors.   Silva Rivera LPN

## 2024-08-28 NOTE — PROGRESS NOTES
Cardiology Office Follow up      Ata Waller  1950  [id number]    Date: 24    CC: had concerns including Follow-up (Stent f/u /Chest Pain, RBBB, HTN).    HPI:   Ata Waller  is here today with his wife Selma for follow up after cardiac cath. He states he has been able to resume activity, but does notice occasional chest discomfort. This happens at random intervals almost daily. He has not noticed any aggravating or alleviating factors, pain is 3/10, this occurs even at rest. He also endorses continue myalgia due to statin despite dose reduction. Otherwise he states he is recovering well, denies SOB, palpitations, or syncope. He states that he eats a healthy diet.       Past Medical:  Past Medical History:   Diagnosis Date    ED (erectile dysfunction)     Elevated PSA     Prostate cancer (HCC)          Past Surgical:  Past Surgical History:   Procedure Laterality Date    ABDOMINAL HERNIA REPAIR      CARDIAC PROCEDURE N/A 2024    Left heart cath / coronary angiography performed by Konrad Atwood MD at [facility] Cardiac Cath/IR    CARDIAC PROCEDURE N/A 2024    Percutaneous coronary intervention performed by Konrad Atwood MD at [facility] Cardiac Cath/IR    JOINT REPLACEMENT      hip    PROSTATECTOMY           Family History:  No family history on file.    Social History:  Social History     Socioeconomic History    Marital status:      Spouse name: Mariana    Number of children: 2    Years of education: Not on file    Highest education level: Not on file   Occupational History    Not on file   Tobacco Use    Smoking status: Former     Current packs/day: 0.00     Types: Cigarettes     Start date: 1969     Quit date: 1987     Years since quittin.3    Smokeless tobacco: Never   Substance and Sexual Activity    Alcohol use: Yes     Alcohol/week: 3.0 standard drinks of alcohol     Types: 3 Standard drinks or equivalent per week    Drug use: Not on file

## 2024-08-29 ENCOUNTER — OFFICE VISIT (OUTPATIENT)
Age: 74
End: 2024-08-29
Payer: MEDICARE

## 2024-08-29 VITALS
DIASTOLIC BLOOD PRESSURE: 65 MMHG | WEIGHT: 172.8 LBS | SYSTOLIC BLOOD PRESSURE: 114 MMHG | OXYGEN SATURATION: 98 % | HEART RATE: 72 BPM | BODY MASS INDEX: 23.43 KG/M2

## 2024-08-29 DIAGNOSIS — E78.2 MIXED HYPERLIPIDEMIA: ICD-10-CM

## 2024-08-29 DIAGNOSIS — R07.9 CHEST PAIN, UNSPECIFIED TYPE: Primary | ICD-10-CM

## 2024-08-29 DIAGNOSIS — I20.89 ANGINA AT REST (HCC): ICD-10-CM

## 2024-08-29 DIAGNOSIS — I10 ESSENTIAL HYPERTENSION: ICD-10-CM

## 2024-08-29 DIAGNOSIS — I20.9 ANGINA PECTORIS WITHOUT MYOCARDIAL INFARCTION (HCC): ICD-10-CM

## 2024-08-29 PROCEDURE — 3078F DIAST BP <80 MM HG: CPT

## 2024-08-29 PROCEDURE — 1123F ACP DISCUSS/DSCN MKR DOCD: CPT

## 2024-08-29 PROCEDURE — 99214 OFFICE O/P EST MOD 30 MIN: CPT

## 2024-08-29 PROCEDURE — 93000 ELECTROCARDIOGRAM COMPLETE: CPT

## 2024-08-29 PROCEDURE — 3074F SYST BP LT 130 MM HG: CPT

## 2024-08-29 RX ORDER — ROSUVASTATIN CALCIUM 10 MG/1
10 TABLET, COATED ORAL NIGHTLY
Qty: 30 TABLET | Refills: 3 | Status: SHIPPED | OUTPATIENT
Start: 2024-08-29

## 2024-08-29 RX ORDER — ISOSORBIDE MONONITRATE 30 MG/1
30 TABLET, EXTENDED RELEASE ORAL DAILY
Qty: 30 TABLET | Refills: 3 | Status: SHIPPED | OUTPATIENT
Start: 2024-08-29

## 2024-08-29 ASSESSMENT — ENCOUNTER SYMPTOMS
COLOR CHANGE: 0
EYE DISCHARGE: 0
ABDOMINAL DISTENTION: 0
EYE REDNESS: 0
SHORTNESS OF BREATH: 0
COUGH: 0
CHEST TIGHTNESS: 0
WHEEZING: 0
FACIAL SWELLING: 0

## 2024-09-02 ENCOUNTER — PATIENT MESSAGE (OUTPATIENT)
Age: 74
End: 2024-09-02

## 2024-09-03 DIAGNOSIS — E78.2 MIXED HYPERLIPIDEMIA: Primary | ICD-10-CM

## 2024-09-03 RX ORDER — EZETIMIBE 10 MG/1
10 TABLET ORAL DAILY
Qty: 30 TABLET | Refills: 3 | Status: SHIPPED | OUTPATIENT
Start: 2024-09-03

## 2024-09-06 ENCOUNTER — OFFICE VISIT (OUTPATIENT)
Dept: FAMILY MEDICINE CLINIC | Age: 74
End: 2024-09-06

## 2024-09-06 VITALS
DIASTOLIC BLOOD PRESSURE: 70 MMHG | WEIGHT: 176.4 LBS | RESPIRATION RATE: 12 BRPM | HEART RATE: 62 BPM | SYSTOLIC BLOOD PRESSURE: 118 MMHG | OXYGEN SATURATION: 98 % | TEMPERATURE: 97.4 F | BODY MASS INDEX: 23.92 KG/M2

## 2024-09-06 DIAGNOSIS — I25.119 CORONARY ARTERY DISEASE INVOLVING NATIVE CORONARY ARTERY OF NATIVE HEART WITH ANGINA PECTORIS (HCC): Primary | ICD-10-CM

## 2024-09-06 DIAGNOSIS — E78.5 HYPERLIPIDEMIA, UNSPECIFIED HYPERLIPIDEMIA TYPE: ICD-10-CM

## 2024-09-06 RX ORDER — ACETAMINOPHEN AND CODEINE PHOSPHATE 300; 30 MG/1; MG/1
TABLET ORAL
COMMUNITY
Start: 2024-07-02

## 2024-09-06 RX ORDER — AMOXICILLIN 500 MG/1
CAPSULE ORAL
COMMUNITY
Start: 2024-07-02

## 2024-09-06 RX ORDER — IBUPROFEN 800 MG/1
TABLET, FILM COATED ORAL
COMMUNITY
Start: 2024-07-02

## 2024-10-14 NOTE — PROGRESS NOTES
Dermatology Patient Note  [facility], [facility] DERMATOLOGY  [address]  [address]  [facility] [place] [address]  Dept: [phone]  Dept Fax: [phone]      VISITDATE: 10/16/2024   REFERRING PROVIDER: No ref. provider found      Ata Waller is a 73 y.o. male  who presents today in the office for:    Other (F/U FBSC Hx of SCC. Patient has no new skin concerns today)      HISTORY OF PRESENT ILLNESS:  Patient presents for 6 month FBSE. History of SCCIS left interior neck s/p ED&C 2/6/24. At  on 4/11/24, 6 AKs on the nose, temples, scalp were treated with cryotherapy. He was to start clobetasol cream BID for hand dermatitis.     He denies specific areas of concern today aside from a lesion on his back that his wife feels has become larger. He notes that this had to be drained in the past.     MEDICAL PROBLEMS:  Patient Active Problem List    Diagnosis Date Noted    S/P cardiac cath 07/29/2024     Priority: High    S/P PTCA (percutaneous transluminal coronary angioplasty) 07/29/2024     Priority: High    History of kidney stones 08/21/2022     Priority: Medium    Primary osteoarthritis of left hip 08/21/2022     Priority: Medium    S/P total left hip arthroplasty 08/21/2022     Priority: Medium    Tubular adenoma of colon 08/21/2022     Priority: Medium    Polyp of colon 08/21/2022     Priority: Medium    AVM (arteriovenous malformation) of colon 08/21/2022     Priority: Medium    Diverticulosis 08/21/2022     Priority: Medium    Sleep disturbance 08/21/2022     Priority: Medium    Adjustment disorder with anxiety 08/21/2022     Priority: Medium    Coronary artery disease involving native coronary artery of native heart with angina pectoris (HCC) 09/06/2024    Primary osteoarthritis of right hip 02/23/2024    Hyperlipidemia 02/23/2024    Nocturia 02/03/2021    Erectile dysfunction after radical prostatectomy 08/03/2020    History of prostate cancer 08/03/2020

## 2024-10-16 ENCOUNTER — OFFICE VISIT (OUTPATIENT)
Dept: DERMATOLOGY | Age: 74
End: 2024-10-16
Payer: MEDICARE

## 2024-10-16 VITALS
BODY MASS INDEX: 23.86 KG/M2 | OXYGEN SATURATION: 97 % | WEIGHT: 176 LBS | TEMPERATURE: 98.1 F | DIASTOLIC BLOOD PRESSURE: 63 MMHG | HEART RATE: 63 BPM | SYSTOLIC BLOOD PRESSURE: 121 MMHG

## 2024-10-16 DIAGNOSIS — L72.9 CYST OF SKIN: ICD-10-CM

## 2024-10-16 DIAGNOSIS — L57.8 ACTINIC SKIN DAMAGE: ICD-10-CM

## 2024-10-16 DIAGNOSIS — L81.4 LENTIGO: ICD-10-CM

## 2024-10-16 DIAGNOSIS — L30.9 HAND DERMATITIS: ICD-10-CM

## 2024-10-16 DIAGNOSIS — L57.0 ACTINIC KERATOSES: ICD-10-CM

## 2024-10-16 DIAGNOSIS — D48.5 NEOPLASM OF UNCERTAIN BEHAVIOR OF SKIN: ICD-10-CM

## 2024-10-16 DIAGNOSIS — Z86.007 HISTORY OF SQUAMOUS CELL CARCINOMA IN SITU: Primary | ICD-10-CM

## 2024-10-16 PROCEDURE — 11102 TANGNTL BX SKIN SINGLE LES: CPT | Performed by: DERMATOLOGY

## 2024-10-16 PROCEDURE — 17003 DESTRUCT PREMALG LES 2-14: CPT | Performed by: DERMATOLOGY

## 2024-10-16 PROCEDURE — 99213 OFFICE O/P EST LOW 20 MIN: CPT | Performed by: DERMATOLOGY

## 2024-10-16 PROCEDURE — 1123F ACP DISCUSS/DSCN MKR DOCD: CPT | Performed by: DERMATOLOGY

## 2024-10-16 PROCEDURE — 17000 DESTRUCT PREMALG LESION: CPT | Performed by: DERMATOLOGY

## 2024-10-16 RX ORDER — LIDOCAINE HYDROCHLORIDE AND EPINEPHRINE 10; 10 MG/ML; UG/ML
0.3 INJECTION, SOLUTION INFILTRATION; PERINEURAL ONCE
Status: COMPLETED | OUTPATIENT
Start: 2024-10-16 | End: 2024-10-17

## 2024-10-16 NOTE — PATIENT INSTRUCTIONS
Sun Protection     There are two types of sun rays that are harmful to the skin.  UVA rays cause skin aging and skin cancer, such as melanoma.  UVB rays cause sunburns, cataracts, and also contribute to skin cancer.    The American-Academy of Dermatology recommends that children and adults wear a broad spectrum, waterproof sunscreen with a Sun Protection Factor (SPF) of 30 or higher.  It is important to check the ingredient label to be sure the sunscreen will protect the skin from both UVA and UVB sunrays.  Your sunscreen should contain at least one of the following ingredients: titanium dioxide, zinc oxide, or avobenzone.    Sunscreen will not be effective unless it is applied to all exposed skin.  Sunscreens work best if they are applied 30 minutes before sun exposure.  They should be reapplied every 2 hours and after any water exposure.    Sunscreen is not perfect.  It is important to use other methods to protect the skin from sun exposure also.  Wear hats, sunglasses and other sun protective clothing when outdoors.  Stay in the shade during the peak hours of sun exposure between 10 AM and 4 PM.      BIOPSY WOUND CARE    A biopsy is where a small piece of skin tissue is removed and examined by a pathologist.  When a biopsy is done, there is a small wound site that requires proper care to prevent infection and scarring.  Some biopsies require sutures and their removal.    How to Care for Biopsy Wound    A.  Leave band-aid or dressing on for 24 hours.  B.  Wash two times a day with soap and water.  C.  Let the wound air dry, then apply Vaseline ointment and cover with a Band-Aid       unless otherwise instructed by your provider.   D.  If there is slight discomfort, you may give acetaminophen or ibuprofen.    When To Call the Doctor    Call the Dermatology Clinic or your doctor if any of the following occur:    A.  Redness and swelling  B.  Tenderness and warm to touch  C.  Drainage from wound  D.  Fever    Biopsy

## 2024-10-17 ENCOUNTER — LAB (OUTPATIENT)
Dept: LAB | Age: 74
End: 2024-10-17

## 2024-10-17 PROCEDURE — 96372 THER/PROPH/DIAG INJ SC/IM: CPT | Performed by: DERMATOLOGY

## 2024-10-17 RX ADMIN — LIDOCAINE HYDROCHLORIDE AND EPINEPHRINE 0.3 ML: 10; 10 INJECTION, SOLUTION INFILTRATION; PERINEURAL at 12:55

## 2024-10-21 NOTE — RESULT ENCOUNTER NOTE
The lesion we biopsied is the most common and least serious form of skin cancer, a basal cell carcinoma. It can be removed and cured by electrodessication and curettage, a simple in-office procedure with local anesthesia. After numbing, the lesion with be scraped and burned to completely remove it. Please schedule for HALF HOUR.

## 2024-11-06 DIAGNOSIS — G47.9 SLEEP DISTURBANCE: ICD-10-CM

## 2024-11-06 RX ORDER — ZOLPIDEM TARTRATE 5 MG/1
5 TABLET ORAL DAILY
Qty: 30 TABLET | Refills: 0 | Status: SHIPPED | OUTPATIENT
Start: 2024-11-06 | End: 2025-12-06

## 2024-11-06 NOTE — TELEPHONE ENCOUNTER
LOV 9/6/24   RTO 3/6/25  LRF 3/7/24          Controlled Substance Monitoring:    Acute and Chronic Pain Monitoring:   RX Monitoring Periodic Controlled Substance Monitoring   2/23/2024  10:58 AM No signs of potential drug abuse or diversion identified.;Random urine drug screen sent today.

## 2024-12-05 ENCOUNTER — OFFICE VISIT (OUTPATIENT)
Age: 74
End: 2024-12-05

## 2024-12-05 VITALS
HEART RATE: 64 BPM | DIASTOLIC BLOOD PRESSURE: 66 MMHG | OXYGEN SATURATION: 100 % | SYSTOLIC BLOOD PRESSURE: 114 MMHG | BODY MASS INDEX: 23.57 KG/M2 | WEIGHT: 173.8 LBS

## 2024-12-05 DIAGNOSIS — E78.2 MIXED HYPERLIPIDEMIA: ICD-10-CM

## 2024-12-05 DIAGNOSIS — Z98.890 S/P CARDIAC CATH: ICD-10-CM

## 2024-12-05 DIAGNOSIS — I10 ESSENTIAL HYPERTENSION: ICD-10-CM

## 2024-12-05 DIAGNOSIS — I25.119 CORONARY ARTERY DISEASE INVOLVING NATIVE CORONARY ARTERY OF NATIVE HEART WITH ANGINA PECTORIS (HCC): ICD-10-CM

## 2024-12-05 DIAGNOSIS — R07.9 CHEST PAIN, UNSPECIFIED TYPE: Primary | ICD-10-CM

## 2024-12-05 ASSESSMENT — ENCOUNTER SYMPTOMS
SHORTNESS OF BREATH: 0
CHEST TIGHTNESS: 0

## 2024-12-05 NOTE — PROGRESS NOTES
Cardiology Office Follow up      Ata Waller  1950  [id number]    Date: 24    CC: had concerns including Follow-up (3 month fup).    HPI:   Ata Waller  is here for routine follow up. He states he has been doing well since last visit. He exercises regularly with out CP or SOB. He denies any episodes of CP, pressure, SOB, palpitations, or syncope.    Past Medical:  Past Medical History:   Diagnosis Date    ED (erectile dysfunction)     Elevated PSA     Prostate cancer (HCC)          Past Surgical:  Past Surgical History:   Procedure Laterality Date    ABDOMINAL HERNIA REPAIR      CARDIAC PROCEDURE N/A 2024    Left heart cath / coronary angiography performed by Konrad Atwood MD at [facility] Cardiac Cath/IR    CARDIAC PROCEDURE N/A 2024    Percutaneous coronary intervention performed by Konrad Atwood MD at [facility] Cardiac Cath/IR    JOINT REPLACEMENT      hip    PROSTATECTOMY           Family History:  No family history on file.    Social History:  Social History     Socioeconomic History    Marital status:      Spouse name: Mariana    Number of children: 2    Years of education: Not on file    Highest education level: Not on file   Occupational History    Not on file   Tobacco Use    Smoking status: Former     Current packs/day: 0.00     Types: Cigarettes     Start date: 1969     Quit date: 1987     Years since quittin.6    Smokeless tobacco: Never   Substance and Sexual Activity    Alcohol use: Yes     Alcohol/week: 3.0 standard drinks of alcohol     Types: 3 Standard drinks or equivalent per week    Drug use: Not on file    Sexual activity: Yes     Partners: Female   Other Topics Concern    Not on file   Social History Narrative    Not on file     Social Determinants of Health     Financial Resource Strain: Low Risk  (2024)    Overall Financial Resource Strain (CARDIA)     Difficulty of Paying Living Expenses: Not hard at all   Food

## 2024-12-10 ENCOUNTER — PROCEDURE VISIT (OUTPATIENT)
Dept: DERMATOLOGY | Age: 74
End: 2024-12-10
Payer: MEDICARE

## 2024-12-10 VITALS
OXYGEN SATURATION: 97 % | TEMPERATURE: 98 F | SYSTOLIC BLOOD PRESSURE: 136 MMHG | WEIGHT: 176 LBS | HEIGHT: 72 IN | BODY MASS INDEX: 23.84 KG/M2 | DIASTOLIC BLOOD PRESSURE: 80 MMHG | HEART RATE: 57 BPM

## 2024-12-10 DIAGNOSIS — D04.9 BASAL CELL CARCINOMA IN SITU OF SKIN: Primary | ICD-10-CM

## 2024-12-10 PROCEDURE — 17282 DSTR MAL LS F/E/E/N/L/M1.1-2: CPT | Performed by: DERMATOLOGY

## 2024-12-10 NOTE — PROGRESS NOTES
Dermatology Surgery Pre-Visit Checklist    (Please complete with  Y (yes) / N (no) or explain)    Pathologic Diagnosis: ED&C BCC    Photo available of surgery site in media: y    Competent to give informed consent? y   If not, who is authorized to do so: y    Need for help for wound care: n   If so, who will do so: n    Are you diagnosed:   Diabetes: n   If yes, last A1C: n       HIV: n       Hepatitis: n       Current smoker: n  If yes, how much: n    So you have any of the following: Pacemaker: n       Defibrillator: n       Artificial Heart valve: n                Artificial Joints: y hip 2020       Other Implantable Device: stents       Organ Transplant: n       Other: n    Are you on blood thinners such as: Asprin: y       Warfarin/Coumadin: plavix       Other: n    Any allergies to the following:  Lidocaine: n       Iodine: n       Adhesive: n       Other: n

## 2024-12-10 NOTE — PROGRESS NOTES
Dermatology Procedure Note   [facility] DERMATOLOGY  [address]  [address]  [place] [place] [address]  Dept: [phone]  Dept Fax: [phone]      Procedure Date: 12/10/2024  Procedure Time: 1:10 PM    Procedure Practitioner: Hilda Marshall MD    Procedure: Lesion Destruction    Pre-Procedure Diagnosis: Basal Cell Carcinoma    Post-Procedure Diagnosis: Same as Pre-Procedure Diagnosis    Informed Consent: The procedure and its risks were explained including but not limited to pain, bleeding, infection, permanent scar, permanent pigment alteration and recurrence. Consent to proceed with the procedure was obtained from the patient or the parent by the practitioner    Time Out:  A time out was conducted immediately before starting the procedure that confirmed a final verification of the correct patient, correct procedure, and correct site.    Procedure Details:  Malignant Destruction: The procedure and its risks were explained including but not limited to pain, bleeding, infection, permanent scar, permanent pigment alteration and need for an additional procedure. Consent to proceed with the procedure was obtained from the patient or the parent. After cleaning with alcohol the 6 mm BCC on the right temple was anesthetized with buffered 1% lidocaine with epinephrine and treated with 3 rounds of curettage and fulguration. After the first round the defect was 12 mm. The defect was coated in Vaseline and a bandage was applied.      Procedure Performed By: Hilda Marshall MD    Estimated Blood Loss: Minimal    Pathologic Specimen: none sent    Procedure Tolerance: Good    Complication(s): None    Electronically signed by Hilda Marshall MD on 12/10/24 at 1:10 PM EST

## 2024-12-23 ENCOUNTER — TELEPHONE (OUTPATIENT)
Age: 74
End: 2024-12-23

## 2024-12-23 NOTE — TELEPHONE ENCOUNTER
We received request on Rosuvastatin to be filled from [facility]. Last time patient seen Jaylyn he wanted to try diet first to see if he could get his cholesterol down. Did call patient to clarify he still would like to hold of from taking medication. I did let him know we will not send in new script yet. Patient was agreeable on waiting to take medication. Will let the pharmacy know patient is not taking med at this time.

## 2025-01-07 RX ORDER — BETAMETHASONE DIPROPIONATE 0.5 MG/G
CREAM TOPICAL
Qty: 45 G | Refills: 2 | Status: SHIPPED | OUTPATIENT
Start: 2025-01-07 | End: 2026-01-05

## 2025-01-07 NOTE — TELEPHONE ENCOUNTER
LOV 9/6/24   RTO 3/6/25  LRF 3/4/24          Controlled Substance Monitoring:    Acute and Chronic Pain Monitoring:   RX Monitoring Periodic Controlled Substance Monitoring   11/6/2024  11:51 PM No signs of potential drug abuse or diversion identified.

## 2025-01-13 RX ORDER — CLOPIDOGREL BISULFATE 75 MG/1
75 TABLET ORAL DAILY
Qty: 90 TABLET | Refills: 3 | Status: SHIPPED | OUTPATIENT
Start: 2025-01-13

## 2025-01-13 RX ORDER — CLOPIDOGREL BISULFATE 75 MG/1
75 TABLET ORAL DAILY
Qty: 30 TABLET | Refills: 0 | OUTPATIENT
Start: 2025-01-13

## 2025-01-15 DIAGNOSIS — I25.119 CORONARY ARTERY DISEASE INVOLVING NATIVE CORONARY ARTERY OF NATIVE HEART WITH ANGINA PECTORIS (HCC): Primary | ICD-10-CM

## 2025-01-15 DIAGNOSIS — E78.5 HYPERLIPIDEMIA, UNSPECIFIED HYPERLIPIDEMIA TYPE: ICD-10-CM

## 2025-01-28 NOTE — PROGRESS NOTES
Orders signed.  Due on or after 3/1/2025 just prior to his follow up.  Please make sure he is aware.   
Pt is requesting his yearly labs  
Sent Lingohub message   
3.9

## 2025-03-03 ENCOUNTER — HOSPITAL ENCOUNTER (OUTPATIENT)
Age: 75
Setting detail: SPECIMEN
Discharge: HOME | End: 2025-03-03

## 2025-03-03 DIAGNOSIS — E78.5 HYPERLIPIDEMIA, UNSPECIFIED HYPERLIPIDEMIA TYPE: ICD-10-CM

## 2025-03-03 DIAGNOSIS — I25.119 CORONARY ARTERY DISEASE INVOLVING NATIVE CORONARY ARTERY OF NATIVE HEART WITH ANGINA PECTORIS: ICD-10-CM

## 2025-03-03 LAB
ALBUMIN SERPL-MCNC: 4.5 G/DL (ref 3.5–5.2)
ALBUMIN/GLOB SERPL: 1.7 {RATIO} (ref 1–2.5)
ALP SERPL-CCNC: 89 U/L (ref 40–129)
ALT SERPL-CCNC: 42 U/L (ref 10–50)
ANION GAP SERPL CALCULATED.3IONS-SCNC: 12 MMOL/L (ref 9–16)
AST SERPL-CCNC: 47 U/L (ref 10–50)
BILIRUB SERPL-MCNC: 0.4 MG/DL (ref 0–1.2)
BUN SERPL-MCNC: 23 MG/DL (ref 8–23)
CALCIUM SERPL-MCNC: 9.6 MG/DL (ref 8.6–10.4)
CHLORIDE SERPL-SCNC: 104 MMOL/L (ref 98–107)
CHOLEST SERPL-MCNC: 191 MG/DL (ref 0–199)
CHOLESTEROL/HDL RATIO: 4.5
CO2 SERPL-SCNC: 26 MMOL/L (ref 20–31)
CREAT SERPL-MCNC: 1.1 MG/DL (ref 0.7–1.2)
ERYTHROCYTE [DISTWIDTH] IN BLOOD BY AUTOMATED COUNT: 12.5 % (ref 11.8–14.4)
GFR, ESTIMATED: 70 ML/MIN/1.73M2
GLUCOSE SERPL-MCNC: 94 MG/DL (ref 74–99)
HCT VFR BLD AUTO: 47 % (ref 40.7–50.3)
HDLC SERPL-MCNC: 42 MG/DL
HGB BLD-MCNC: 15.4 G/DL (ref 13–17)
LDLC SERPL CALC-MCNC: 129 MG/DL (ref 0–100)
MCH RBC QN AUTO: 29.3 PG (ref 25.2–33.5)
MCHC RBC AUTO-ENTMCNC: 32.8 G/DL (ref 28.4–34.8)
MCV RBC AUTO: 89.5 FL (ref 82.6–102.9)
NRBC BLD-RTO: 0 PER 100 WBC
PLATELET # BLD AUTO: 200 K/UL (ref 138–453)
PMV BLD AUTO: 9.6 FL (ref 8.1–13.5)
POTASSIUM SERPL-SCNC: 4.8 MMOL/L (ref 3.7–5.3)
PROT SERPL-MCNC: 7.2 G/DL (ref 6.6–8.7)
RBC # BLD AUTO: 5.25 M/UL (ref 4.21–5.77)
SODIUM SERPL-SCNC: 142 MMOL/L (ref 136–145)
TRIGL SERPL-MCNC: 98 MG/DL
VLDLC SERPL CALC-MCNC: 20 MG/DL (ref 1–30)
WBC OTHER # BLD: 5.4 K/UL (ref 3.5–11.3)

## 2025-03-04 SDOH — HEALTH STABILITY: PHYSICAL HEALTH: ON AVERAGE, HOW MANY DAYS PER WEEK DO YOU ENGAGE IN MODERATE TO STRENUOUS EXERCISE (LIKE A BRISK WALK)?: 5 DAYS

## 2025-03-04 SDOH — ECONOMIC STABILITY: INCOME INSECURITY: IN THE LAST 12 MONTHS, WAS THERE A TIME WHEN YOU WERE NOT ABLE TO PAY THE MORTGAGE OR RENT ON TIME?: NO

## 2025-03-04 SDOH — HEALTH STABILITY: PHYSICAL HEALTH: ON AVERAGE, HOW MANY MINUTES DO YOU ENGAGE IN EXERCISE AT THIS LEVEL?: 40 MIN

## 2025-03-04 SDOH — ECONOMIC STABILITY: FOOD INSECURITY: WITHIN THE PAST 12 MONTHS, THE FOOD YOU BOUGHT JUST DIDN'T LAST AND YOU DIDN'T HAVE MONEY TO GET MORE.: NEVER TRUE

## 2025-03-04 SDOH — ECONOMIC STABILITY: FOOD INSECURITY: WITHIN THE PAST 12 MONTHS, YOU WORRIED THAT YOUR FOOD WOULD RUN OUT BEFORE YOU GOT MONEY TO BUY MORE.: NEVER TRUE

## 2025-03-04 SDOH — ECONOMIC STABILITY: TRANSPORTATION INSECURITY
IN THE PAST 12 MONTHS, HAS THE LACK OF TRANSPORTATION KEPT YOU FROM MEDICAL APPOINTMENTS OR FROM GETTING MEDICATIONS?: NO

## 2025-03-04 ASSESSMENT — PATIENT HEALTH QUESTIONNAIRE - PHQ9
2. FEELING DOWN, DEPRESSED OR HOPELESS: NOT AT ALL
1. LITTLE INTEREST OR PLEASURE IN DOING THINGS: NOT AT ALL
SUM OF ALL RESPONSES TO PHQ QUESTIONS 1-9: 0

## 2025-03-04 ASSESSMENT — LIFESTYLE VARIABLES
HOW OFTEN DO YOU HAVE A DRINK CONTAINING ALCOHOL: 2
HOW OFTEN DO YOU HAVE A DRINK CONTAINING ALCOHOL: MONTHLY OR LESS
HOW MANY STANDARD DRINKS CONTAINING ALCOHOL DO YOU HAVE ON A TYPICAL DAY: 1
HOW OFTEN DO YOU HAVE SIX OR MORE DRINKS ON ONE OCCASION: 1
HOW MANY STANDARD DRINKS CONTAINING ALCOHOL DO YOU HAVE ON A TYPICAL DAY: 1 OR 2

## 2025-03-06 ENCOUNTER — OFFICE VISIT (OUTPATIENT)
Dept: FAMILY MEDICINE CLINIC | Age: 75
End: 2025-03-06

## 2025-03-06 VITALS
BODY MASS INDEX: 24.38 KG/M2 | HEIGHT: 72 IN | OXYGEN SATURATION: 98 % | HEART RATE: 64 BPM | SYSTOLIC BLOOD PRESSURE: 116 MMHG | DIASTOLIC BLOOD PRESSURE: 62 MMHG | WEIGHT: 180 LBS

## 2025-03-06 DIAGNOSIS — D12.6 TUBULAR ADENOMA OF COLON: ICD-10-CM

## 2025-03-06 DIAGNOSIS — Z96.642 S/P TOTAL LEFT HIP ARTHROPLASTY: ICD-10-CM

## 2025-03-06 DIAGNOSIS — Z85.828 HISTORY OF SCC (SQUAMOUS CELL CARCINOMA) OF SKIN: ICD-10-CM

## 2025-03-06 DIAGNOSIS — Z12.12 SCREENING FOR COLORECTAL CANCER: ICD-10-CM

## 2025-03-06 DIAGNOSIS — F43.22 ADJUSTMENT DISORDER WITH ANXIETY: ICD-10-CM

## 2025-03-06 DIAGNOSIS — Z85.46 HISTORY OF PROSTATE CANCER: ICD-10-CM

## 2025-03-06 DIAGNOSIS — L30.9 ECZEMA, UNSPECIFIED TYPE: ICD-10-CM

## 2025-03-06 DIAGNOSIS — N52.31 ERECTILE DYSFUNCTION AFTER RADICAL PROSTATECTOMY: ICD-10-CM

## 2025-03-06 DIAGNOSIS — Z51.81 THERAPEUTIC DRUG MONITORING: ICD-10-CM

## 2025-03-06 DIAGNOSIS — Z00.00 MEDICARE ANNUAL WELLNESS VISIT, SUBSEQUENT: Primary | ICD-10-CM

## 2025-03-06 DIAGNOSIS — K63.5 HYPERPLASTIC COLONIC POLYP, UNSPECIFIED PART OF COLON: ICD-10-CM

## 2025-03-06 DIAGNOSIS — M25.551 RIGHT HIP PAIN: ICD-10-CM

## 2025-03-06 DIAGNOSIS — M16.11 PRIMARY OSTEOARTHRITIS OF RIGHT HIP: ICD-10-CM

## 2025-03-06 DIAGNOSIS — Z87.442 HISTORY OF KIDNEY STONES: ICD-10-CM

## 2025-03-06 DIAGNOSIS — R35.1 NOCTURIA: ICD-10-CM

## 2025-03-06 DIAGNOSIS — Z85.828 HISTORY OF BASAL CELL CARCINOMA (BCC): ICD-10-CM

## 2025-03-06 DIAGNOSIS — Z23 NEED FOR PNEUMOCOCCAL VACCINATION: ICD-10-CM

## 2025-03-06 DIAGNOSIS — E78.5 HYPERLIPIDEMIA, UNSPECIFIED HYPERLIPIDEMIA TYPE: ICD-10-CM

## 2025-03-06 DIAGNOSIS — Z12.11 SCREENING FOR COLORECTAL CANCER: ICD-10-CM

## 2025-03-06 DIAGNOSIS — G47.9 SLEEP DISTURBANCE: ICD-10-CM

## 2025-03-06 DIAGNOSIS — I25.119 CORONARY ARTERY DISEASE INVOLVING NATIVE CORONARY ARTERY OF NATIVE HEART WITH ANGINA PECTORIS: ICD-10-CM

## 2025-03-06 LAB
ALCOHOL URINE: NORMAL
AMPHETAMINE SCREEN URINE: NORMAL
BARBITURATE SCREEN URINE: NORMAL
BENZODIAZEPINE SCREEN, URINE: NORMAL
BUPRENORPHINE URINE: NORMAL
COCAINE METABOLITE SCREEN URINE: NORMAL
FENTANYL SCREEN, URINE: NORMAL
GABAPENTIN SCREEN, URINE: NORMAL
MDMA, URINE: NORMAL
METHADONE SCREEN, URINE: NORMAL
METHAMPHETAMINE, URINE: NORMAL
OPIATE SCREEN URINE: NORMAL
OXYCODONE SCREEN URINE: NORMAL
PHENCYCLIDINE SCREEN URINE: NORMAL
PROPOXYPHENE SCREEN, URINE: NORMAL
SYNTHETIC CANNABINOIDS(K2) SCREEN, URINE: NORMAL
THC SCREEN, URINE: NORMAL
TRAMADOL SCREEN URINE: NORMAL
TRICYCLIC ANTIDEPRESSANTS, UR: NORMAL

## 2025-03-06 NOTE — PROGRESS NOTES
Ata Waller (:  1950) is a 74 y.o. male,Established patient, here for evaluation of the following chief complaint(s):    Medicare AWV and Follow-up         Assessment & Plan  Medicare annual wellness visit, subsequent            Coronary artery disease involving native coronary artery of native heart with angina pectoris       Orders:    Nickie Deamrco MD, Cardiology, [place]    Hyperlipidemia, unspecified hyperlipidemia type       Orders:    Nickie Demarco MD, Cardiology, [place]    History of prostate cancer            Erectile dysfunction after radical prostatectomy            Nocturia            History of kidney stones            S/P total left hip arthroplasty            Right hip pain            Primary osteoarthritis of right hip            Tubular adenoma of colon       Orders:    Mia Cat MD, Gastroenterology, [place]    Hyperplastic colonic polyp, unspecified part of colon       Orders:    Mia Cat MD, Gastroenterology, [place]    Screening for colorectal cancer       Orders:    Mia Cat MD, Gastroenterology, [place]    Sleep disturbance       Orders:    zolpidem (AMBIEN) 5 MG tablet; Take 1 tablet by mouth daily. Max Daily Amount: 5 mg    Adjustment disorder with anxiety       Orders:    LORazepam (ATIVAN) 0.5 MG tablet; Take 2 tablets by mouth daily as needed for Anxiety. Max Daily Amount: 1 mg    Therapeutic drug monitoring       Orders:    PO[facility] Rapid Drug Screen    Eczema, unspecified type            History of SCC (squamous cell carcinoma) of skin            History of basal cell carcinoma (BCC)            Need for pneumococcal vaccination       Orders:    Pneumococcal, PCV20, PREVNAR 20, (age 6w+), IM, PF      Reviewed recent labs  Continue same medications   Second opinion with Dr. Manzano   Discuss cholesterol medications with cardiology   Follow-up with urology as scheduled  Follow-up with Ortho as needed  Repeat 
daily Yes Yokasta Guerra MD   aspirin 81 MG EC tablet Take 1 tablet by mouth daily Yes Konrad Atwood MD   clobetasol (TEMOVATE) 0.05 % cream Apply to rash twice daily, avoiding face and skin folds Yes Hilda Marshall MD   LORazepam (ATIVAN) 0.5 MG tablet Take 2 tablets by mouth daily as needed for Anxiety. Max Daily Amount: 1 mg Yes Cathy Romero MD   triamcinolone (KENALOG) 0.1 % cream Apply to rash twice daily (not face, armpit or groin) Yes Hilda Marshall MD   acetaminophen-codeine (TYLENOL #3) 300-30 MG per tablet   Yokasta Guerra MD   amoxicillin (AMOXIL) 500 MG capsule   Yokasta Guerra MD   ibuprofen (ADVIL;MOTRIN) 800 MG tablet   Yokasta Guerra MD   ezetimibe (ZETIA) 10 MG tablet Take 1 tablet by mouth daily  Patient not taking: Reported on 10/16/2024  Jaylyn Rodriguez APRN - CNP   isosorbide mononitrate (IMDUR) 30 MG extended release tablet Take 1 tablet by mouth daily  Patient not taking: Reported on 3/6/2025  Michael, MEGAN De La O CNP   PAPAVERINE-PHENTOLAMINE IC by IntraCAVernosal route  Patient not taking: Reported on 10/16/2024  Yokasta Guerra MD CareTeam (Including outside providers/suppliers regularly involved in providing care):     Patient Care Team:  Cathy Romero MD as PCP - General (Internal Medicine)  [facility]  Emory Fisher MD as Consulting Physician (Urology)     Recommendations for Preventive Services Due: see orders and patient instructions/AVS.  Recommended screening schedule for the next 5-10 years is provided to the patient in written form: see Patient Instructions/AVS.     Reviewed and updated this visit:  Tobacco  Allergies  Meds  Problems  Med Hx  Surg Hx  Fam Hx

## 2025-03-06 NOTE — ASSESSMENT & PLAN NOTE
Orders:    LORazepam (ATIVAN) 0.5 MG tablet; Take 2 tablets by mouth daily as needed for Anxiety. Max Daily Amount: 1 mg

## 2025-03-07 NOTE — ASSESSMENT & PLAN NOTE
Orders:    zolpidem (AMBIEN) 5 MG tablet; Take 1 tablet by mouth daily. Max Daily Amount: 5 mg

## 2025-03-11 ENCOUNTER — PATIENT MESSAGE (OUTPATIENT)
Dept: FAMILY MEDICINE CLINIC | Age: 75
End: 2025-03-11

## 2025-03-11 DIAGNOSIS — G47.9 SLEEP DISTURBANCE: ICD-10-CM

## 2025-03-11 RX ORDER — ZOLPIDEM TARTRATE 5 MG/1
5 TABLET ORAL DAILY
Qty: 30 TABLET | Refills: 0 | Status: SHIPPED | OUTPATIENT
Start: 2025-03-11 | End: 2026-04-10

## 2025-03-11 RX ORDER — LORAZEPAM 0.5 MG/1
1 TABLET ORAL DAILY PRN
Qty: 30 TABLET | Refills: 0 | Status: SHIPPED | OUTPATIENT
Start: 2025-03-11 | End: 2026-04-10

## 2025-03-11 RX ORDER — ZOLPIDEM TARTRATE 5 MG/1
5 TABLET ORAL DAILY
Qty: 30 TABLET | Refills: 0 | OUTPATIENT
Start: 2025-03-11 | End: 2026-04-10

## 2025-03-12 ENCOUNTER — TELEPHONE (OUTPATIENT)
Dept: GASTROENTEROLOGY | Age: 75
End: 2025-03-12

## 2025-03-12 NOTE — TELEPHONE ENCOUNTER
Attempt 1, writer called pt, no answer. Left voicemail message to call office back to schedule colonoscopy/Aziz.    Attempt 2, writer sent colon letter/screening questionnaire in mail to pt.    Colonoscopy (WQ/Screening)  Dx: Tubular adenoma of colon; Hyperplastic colonic polyp, unspecified part of colon; Screening for colorectal cancer    Pt medication list shows Aspirin 81 mg tab and Plavix.   Pt will need cardiac clearance for CAD.  Pt will need to be seen in office prior to scheduling procedure due to age.

## 2025-03-27 DIAGNOSIS — E78.2 MIXED HYPERLIPIDEMIA: Primary | ICD-10-CM

## 2025-03-27 RX ORDER — ROSUVASTATIN CALCIUM 5 MG/1
5 TABLET, COATED ORAL NIGHTLY
Qty: 30 TABLET | Refills: 3 | Status: SHIPPED | OUTPATIENT
Start: 2025-03-27

## 2025-04-15 NOTE — PROGRESS NOTES
Dermatology Patient Note  [facility]  [facility] DERMATOLOGY  [address] [place] [address]  Dept: [phone]  Dept Fax: [phone]      VISITDATE: 4/16/2025   REFERRING PROVIDER: No ref. provider found      Ata Waller is a 74 y.o. male  who presents today in the office for:    Other (Patient presents today for a fbse. Hx of SCC. He has no spots of concern today.)      HISTORY OF PRESENT ILLNESS:  Patient presents for 6 month FBSC. Hx of SCCIS left interior neck s/p ED&C 2/6/24. At , he was continued on clobetasol for hand dermatitis. Also had 7 AK's on the nose, scalp, and face treated with cryo. Shave biopsy performed to right temple demonstrating BCC s/p ED&C.     Today, patient has no areas of concern. He states that the cyst on the back has been drained in the past by surgery with pus coming out.     MEDICAL PROBLEMS:  Patient Active Problem List    Diagnosis Date Noted    S/P cardiac cath 07/29/2024     Priority: High    S/P PTCA (percutaneous transluminal coronary angioplasty) 07/29/2024     Priority: High    History of kidney stones 08/21/2022     Priority: Medium    Primary osteoarthritis of left hip 08/21/2022     Priority: Medium    S/P total left hip arthroplasty 08/21/2022     Priority: Medium    Tubular adenoma of colon 08/21/2022     Priority: Medium    Polyp of colon 08/21/2022     Priority: Medium    AVM (arteriovenous malformation) of colon 08/21/2022     Priority: Medium    Diverticulosis 08/21/2022     Priority: Medium    Sleep disturbance 08/21/2022     Priority: Medium    Adjustment disorder with anxiety 08/21/2022     Priority: Medium    History of SCC (squamous cell carcinoma) of skin 03/06/2025    Coronary artery disease involving native coronary artery of native heart with angina pectoris 09/06/2024    Primary osteoarthritis of right hip 02/23/2024    Hyperlipidemia 02/23/2024    Nocturia 02/03/2021    Erectile dysfunction after radical

## 2025-04-16 ENCOUNTER — OFFICE VISIT (OUTPATIENT)
Age: 75
End: 2025-04-16

## 2025-04-16 VITALS
BODY MASS INDEX: 23.98 KG/M2 | WEIGHT: 177 LBS | OXYGEN SATURATION: 97 % | HEART RATE: 62 BPM | SYSTOLIC BLOOD PRESSURE: 117 MMHG | DIASTOLIC BLOOD PRESSURE: 71 MMHG | HEIGHT: 72 IN | TEMPERATURE: 97.2 F

## 2025-04-16 DIAGNOSIS — L72.9 CYST OF SKIN: ICD-10-CM

## 2025-04-16 DIAGNOSIS — L30.9 HAND DERMATITIS: ICD-10-CM

## 2025-04-16 DIAGNOSIS — L81.4 SOLAR LENTIGO: ICD-10-CM

## 2025-04-16 DIAGNOSIS — L57.8 ACTINIC SKIN DAMAGE: ICD-10-CM

## 2025-04-16 DIAGNOSIS — D48.9 NEOPLASM OF UNCERTAIN BEHAVIOR: ICD-10-CM

## 2025-04-16 DIAGNOSIS — Z86.007 HISTORY OF SQUAMOUS CELL CARCINOMA IN SITU: ICD-10-CM

## 2025-04-16 DIAGNOSIS — Z85.828 HISTORY OF BASAL CELL CARCINOMA: Primary | ICD-10-CM

## 2025-04-16 NOTE — PATIENT INSTRUCTIONS
Hand dermatitis   - continue clobetasol cream twice daily as needed     Sun Protection     There are two types of sun rays that are harmful to the skin.  UVA rays cause skin aging and skin cancer, such as melanoma.  UVB rays cause sunburns, cataracts, and also contribute to skin cancer.    The American-Academy of Dermat[employer] recommends that children and adults wear a broad spectrum, waterproof sunscreen with a Sun Protection Factor (SPF) of 30 or higher.  It is important to check the ingredient label to be sure the sunscreen will protect the skin from both UVA and UVB sunrays.  Your sunscreen should contain at least one of the following ingredients: titanium dioxide, zinc oxide, or avobenzone.    Sunscreen will not be effective unless it is applied to all exposed skin.  Sunscreens work best if they are applied 30 minutes before sun exposure.  They should be reapplied every 2 hours and after any water exposure.    Sunscreen is not perfect.  It is important to use other methods to protect the skin from sun exposure also.  Wear hats, sunglasses and other sun protective clothing when outdoors.  Stay in the shade during the peak hours of sun exposure between 10 AM and 4 PM. , Moles    Moles, or nevi, are very common. Moles are areas of the skin where there are more cells called melanocytes. Melanocytes are the cells in the body that produce pigment, or color. Moles can be many colors including skin-tone, pink, tan, brown, and very dark brown to black. Moles can be raised or flat. Moles can have hair. Moles can grow on any skin surface, including the scalp, hands and feet. When someone is born with a mole, or develops one in the first months of life, the mole is called a congenital, or birthmark mole. About 1 in 100 people are born with one or more moles. Most people develop their moles later in childhood or adulthood. These are called acquired moles. They are most common on sun exposed areas of skin such as the face,

## 2025-04-17 ENCOUNTER — LAB (OUTPATIENT)
Dept: LAB | Age: 75
End: 2025-04-17

## 2025-04-22 ENCOUNTER — RESULTS FOLLOW-UP (OUTPATIENT)
Age: 75
End: 2025-04-22

## 2025-04-22 DIAGNOSIS — D04.4 SQUAMOUS CELL CARCINOMA IN SITU (SCCIS) OF SCALP: Primary | ICD-10-CM

## 2025-04-22 NOTE — RESULT ENCOUNTER NOTE
The lesion we biopsied is a common form of skin cancer called a squamous cell carcinoma. Due to location, it should be removed with mohs surgery, which gets the highest cure rate (>99%) while removing the least amount of skin. Referral will be placed to Dr. Angulo. Please schedule patient for 6 month follow-up if not already scheduled.

## 2025-04-23 ENCOUNTER — TELEPHONE (OUTPATIENT)
Dept: GASTROENTEROLOGY | Age: 75
End: 2025-04-23

## 2025-04-23 ENCOUNTER — OFFICE VISIT (OUTPATIENT)
Dept: GASTROENTEROLOGY | Age: 75
End: 2025-04-23
Payer: MEDICARE

## 2025-04-23 VITALS
OXYGEN SATURATION: 100 % | HEIGHT: 72 IN | WEIGHT: 177.4 LBS | TEMPERATURE: 97.6 F | SYSTOLIC BLOOD PRESSURE: 122 MMHG | RESPIRATION RATE: 17 BRPM | HEART RATE: 62 BPM | BODY MASS INDEX: 24.03 KG/M2 | DIASTOLIC BLOOD PRESSURE: 79 MMHG

## 2025-04-23 DIAGNOSIS — I25.10 CORONARY ARTERY DISEASE, UNSPECIFIED VESSEL OR LESION TYPE, UNSPECIFIED WHETHER ANGINA PRESENT, UNSPECIFIED WHETHER NATIVE OR TRANSPLANTED HEART: ICD-10-CM

## 2025-04-23 DIAGNOSIS — Z98.61 S/P PTCA (PERCUTANEOUS TRANSLUMINAL CORONARY ANGIOPLASTY): ICD-10-CM

## 2025-04-23 DIAGNOSIS — D12.6 TUBULAR ADENOMA OF COLON: ICD-10-CM

## 2025-04-23 DIAGNOSIS — K63.5 POLYP OF COLON, UNSPECIFIED PART OF COLON, UNSPECIFIED TYPE: Primary | ICD-10-CM

## 2025-04-23 DIAGNOSIS — K57.90 DIVERTICULOSIS: ICD-10-CM

## 2025-04-23 PROCEDURE — 1159F MED LIST DOCD IN RCRD: CPT | Performed by: INTERNAL MEDICINE

## 2025-04-23 PROCEDURE — 1123F ACP DISCUSS/DSCN MKR DOCD: CPT | Performed by: INTERNAL MEDICINE

## 2025-04-23 PROCEDURE — 99204 OFFICE O/P NEW MOD 45 MIN: CPT | Performed by: INTERNAL MEDICINE

## 2025-04-23 PROCEDURE — 1126F AMNT PAIN NOTED NONE PRSNT: CPT | Performed by: INTERNAL MEDICINE

## 2025-04-23 ASSESSMENT — ENCOUNTER SYMPTOMS
SORE THROAT: 0
CHOKING: 0
DIARRHEA: 0
VOMITING: 0
WHEEZING: 0
COUGH: 0
ABDOMINAL PAIN: 0
ANAL BLEEDING: 0
NAUSEA: 0
RECTAL PAIN: 0
TROUBLE SWALLOWING: 0
CONSTIPATION: 0
ABDOMINAL DISTENTION: 0
VOICE CHANGE: 0
BLOOD IN STOOL: 0

## 2025-04-23 NOTE — PROGRESS NOTES
Reason for Referral:       Cathy Romero MD  [address] [place] [address]    Chief Complaint   Patient presents with    New Patient     Screening for Colon Ca, Tubular Adenoma, Hyperplastic Adenoma            HISTORY OF PRESENT ILLNESS: Mr.John Waller is a 74 y.o. male with a past history remarkable for erectile dysfunction, prostate, referred for evaluation of colon cancer screening and history of colon polyps.  The patient denies any active symptoms.  His last colonoscopy was in 2021.  He was recently diagnosed with coronary artery disease and had multiple stents.  He was started on aspirin and Plavix by cardiology.      Previous Endoscopies    Colonoscopy 2021:  Fair prep  4 mm cecal polyp, 3 mm ascending colon polyp, 1 cm transverse colon polyp, 3 mm sigmoid polyp, all removed    Previous GI workup       Past Medical,Family, and Social History reviewed and does not contribute to the patient presentingcondition.    Patient's PMH/PSH,SH,PSYCH Hx, MEDs, ALLERGIES, and ROS were all reviewed and updated in the appropriate sections.    PAST MEDICAL HISTORY:  Past Medical History:   Diagnosis Date    ED (erectile dysfunction)     Elevated PSA     Prostate cancer (HCC)        Past Surgical History:   Procedure Laterality Date    ABDOMINAL HERNIA REPAIR      CARDIAC PROCEDURE N/A 7/29/2024    Left heart cath / coronary angiography performed by Konrad Atwood MD at [facility] Cardiac Cath/IR    CARDIAC PROCEDURE N/A 7/29/2024    Percutaneous coronary intervention performed by Konrad Atwood MD at [facility] Cardiac Cath/IR    JOINT REPLACEMENT      hip    PROSTATECTOMY         CURRENT MEDICATIONS:    Current Outpatient Medications:     rosuvastatin (CRESTOR) 5 MG tablet, Take 1 tablet by mouth nightly, Disp: 30 tablet, Rfl: 3    zolpidem (AMBIEN) 5 MG tablet, Take 1 tablet by mouth daily. Max Daily Amount: 5 mg, Disp: 30 tablet, Rfl: 0    LORazepam (ATIVAN) 0.5 MG

## 2025-04-24 NOTE — TELEPHONE ENCOUNTER
Patient returned call to office , stated needed to schedule colonoscopy. Please call to discuss      Thank you

## 2025-04-28 NOTE — TELEPHONE ENCOUNTER
----- Message from Dr. Hilda Marshall MD sent at 4/22/2025  3:51 PM EDT -----  The lesion we biopsied is a common form of skin cancer called a squamous cell carcinoma. Due to location, it should be removed with mohs surgery, which gets the highest cure rate (>99%) while removing the least amount of skin. Referral will be placed to Dr. Angulo. Please schedule patient for 6 month follow-up if not already scheduled.

## 2025-04-29 ENCOUNTER — OFFICE VISIT (OUTPATIENT)
Age: 75
End: 2025-04-29
Payer: MEDICARE

## 2025-04-29 VITALS
BODY MASS INDEX: 24.03 KG/M2 | SYSTOLIC BLOOD PRESSURE: 146 MMHG | HEART RATE: 62 BPM | OXYGEN SATURATION: 99 % | WEIGHT: 177.2 LBS | DIASTOLIC BLOOD PRESSURE: 76 MMHG

## 2025-04-29 DIAGNOSIS — I25.119 CORONARY ARTERY DISEASE INVOLVING NATIVE CORONARY ARTERY OF NATIVE HEART WITH ANGINA PECTORIS: Primary | ICD-10-CM

## 2025-04-29 PROCEDURE — 1160F RVW MEDS BY RX/DR IN RCRD: CPT | Performed by: INTERNAL MEDICINE

## 2025-04-29 PROCEDURE — 93000 ELECTROCARDIOGRAM COMPLETE: CPT | Performed by: INTERNAL MEDICINE

## 2025-04-29 PROCEDURE — 1123F ACP DISCUSS/DSCN MKR DOCD: CPT | Performed by: INTERNAL MEDICINE

## 2025-04-29 PROCEDURE — 1159F MED LIST DOCD IN RCRD: CPT | Performed by: INTERNAL MEDICINE

## 2025-04-29 PROCEDURE — 99214 OFFICE O/P EST MOD 30 MIN: CPT | Performed by: INTERNAL MEDICINE

## 2025-04-29 RX ORDER — ISOSORBIDE MONONITRATE 30 MG/1
30 TABLET, EXTENDED RELEASE ORAL DAILY
Qty: 30 TABLET | Refills: 3 | Status: SHIPPED | OUTPATIENT
Start: 2025-04-29

## 2025-04-29 ASSESSMENT — ENCOUNTER SYMPTOMS
CHEST TIGHTNESS: 0
SHORTNESS OF BREATH: 0

## 2025-04-29 NOTE — PROGRESS NOTES
Cardiology Office Follow up      Ata Waller  1950  [id number]    Date: 25    CC: had concerns including Follow-up (Chest discomfort).    HPI:   Ata Waller  is here for routine follow up.   Overall feeling well with no anginal symptoms, excellent functional capacity, compliant with medications, blood pressure is running mildly elevated even at home, he is here to discuss episodes of vague chest pain in the left upper chest which primarily occurs at rest or early in the morning, no relation with exercise    Past Medical:  Past Medical History:   Diagnosis Date    ED (erectile dysfunction)     Elevated PSA     Prostate cancer (HCC)          Past Surgical:  Past Surgical History:   Procedure Laterality Date    ABDOMINAL HERNIA REPAIR      CARDIAC PROCEDURE N/A 2024    Left heart cath / coronary angiography performed by Konrad Atwood MD at [facility] Cardiac Cath/IR    CARDIAC PROCEDURE N/A 2024    Percutaneous coronary intervention performed by oKnrad Atwood MD at [facility] Cardiac Cath/IR    JOINT REPLACEMENT      hip    PROSTATECTOMY           Family History:  Family History   Problem Relation Age of Onset    Heart Disease Mother     High Blood Pressure Mother        Social History:  Social History     Socioeconomic History    Marital status:      Spouse name: Mariana    Number of children: 2    Years of education: Not on file    Highest education level: Not on file   Occupational History    Not on file   Tobacco Use    Smoking status: Former     Current packs/day: 0.00     Average packs/day: 0.5 packs/day for 10.0 years (5.0 ttl pk-yrs)     Types: Cigarettes     Start date: 1968     Quit date: 1987     Years since quittin.0    Smokeless tobacco: Never   Substance and Sexual Activity    Alcohol use: Yes     Alcohol/week: 1.0 standard drink of alcohol     Types: 1 Shots of liquor per week    Drug use: Not on file    Sexual activity: Yes

## 2025-05-19 LAB — PROSTATE SPECIFIC ANTIGEN: 0.01 NG/ML

## 2025-05-20 LAB — PSA, ULTRASENSITIVE: <0.014 NG/ML (ref 0–4)

## 2025-05-21 ENCOUNTER — OFFICE VISIT (OUTPATIENT)
Age: 75
End: 2025-05-21
Payer: MEDICARE

## 2025-05-21 VITALS — HEIGHT: 72 IN | BODY MASS INDEX: 23.98 KG/M2 | WEIGHT: 177 LBS

## 2025-05-21 DIAGNOSIS — N52.31 ERECTILE DYSFUNCTION AFTER RADICAL PROSTATECTOMY: ICD-10-CM

## 2025-05-21 DIAGNOSIS — R35.1 NOCTURIA: ICD-10-CM

## 2025-05-21 DIAGNOSIS — R31.29 MICROHEMATURIA: Primary | ICD-10-CM

## 2025-05-21 DIAGNOSIS — Z85.46 HISTORY OF PROSTATE CANCER: ICD-10-CM

## 2025-05-21 LAB
BILIRUBIN, POC: NORMAL
BLOOD URINE, POC: NORMAL
CLARITY, POC: CLEAR
COLOR, POC: YELLOW
GLUCOSE URINE, POC: NORMAL MG/DL
KETONES, POC: NORMAL
LEUKOCYTE EST, POC: NORMAL
NITRITE, POC: NORMAL
PH, POC: NORMAL
PROTEIN, POC: NORMAL MG/DL
SPECIFIC GRAVITY, POC: NORMAL
UROBILINOGEN, POC: NORMAL

## 2025-05-21 PROCEDURE — 81003 URINALYSIS AUTO W/O SCOPE: CPT | Performed by: SPECIALIST

## 2025-05-21 PROCEDURE — 1159F MED LIST DOCD IN RCRD: CPT | Performed by: SPECIALIST

## 2025-05-21 PROCEDURE — 1123F ACP DISCUSS/DSCN MKR DOCD: CPT | Performed by: SPECIALIST

## 2025-05-21 PROCEDURE — 99214 OFFICE O/P EST MOD 30 MIN: CPT | Performed by: SPECIALIST

## 2025-05-21 NOTE — PROGRESS NOTES
Emory Fisher MD [facility] Urology Office Progress Note    Patient:  Ata Waller  YOB: 1950  Date: 5/21/2025    HISTORY OF PRESENT ILLNESS:   The patient is a 74 y.o. male  No evidence of prostate cancer recurrence s/p 8/8/18 Robotic assisted laparoscopic radical prostatectomy since PSA is 0.014.  Will repeat a PSA in 12 months.   Not interested in medical Rx for ED since patient not sexually active.   Patient has moderate microhematuria and history of kidney stone and he is a previous smoker.  Patient will need a:  CT abdomen and pelvis with and without IV contrast (with CT urogram reconstruction) to evaluate upper urinary tract.  Cystoscopy to evaluate lower urinary tract.   Lower urinary tract symptoms: frequency and nocturia, 2 times per night   Last AUA Symptom Score (QOL): 2 (0)  Today's AUA Symptom Score (QOL): 3 (1)    Summary of old records:   Patient's son in law is Dr. Freddy Iraheta, moved from [place], plays tennis, [place]  History of prostate cancer, s/p RALP 8/8/18 at [facility], T2 G7  ED: inactive; oral meds don't work, Bimix 0.75 mL prn  History of large abdominal hernia with mesh  Nocturia x 2  Microhematuria, moderate 5/21/25, previous smoker, h/o KS: CT urogram, cysto    Additional History: none    Procedures Today: N/A    Urinalysis today:  Results for orders placed or performed in visit on 05/21/25   POCT Urinalysis No Micro (Auto)   Result Value Ref Range    Color, UA yellow     Clarity, UA clear     Glucose, UA POC neg mg/dL    Bilirubin, UA      Ketones, UA      Spec Grav, UA      Blood, UA POC moderate     pH, UA      Protein, UA POC neg mg/dL    Urobilinogen, UA      Leukocytes, UA neg     Nitrite, UA neg        Last several PSA's:  Lab Results   Component Value Date    PSA 0.014 05/19/2025    PSA 0.02 02/16/2024    PSA 0.02 02/02/2023       Last total testosterone:  No results found for: \"TESTOSTERONE\"    Last BUN and creatinine:  Lab Results

## 2025-05-30 ENCOUNTER — PREP FOR PROCEDURE (OUTPATIENT)
Dept: GASTROENTEROLOGY | Age: 75
End: 2025-05-30

## 2025-05-30 DIAGNOSIS — Z86.0100 HX OF COLONIC POLYPS: ICD-10-CM

## 2025-05-30 DIAGNOSIS — K57.92 DIVERTICULITIS: ICD-10-CM

## 2025-05-30 RX ORDER — POLYETHYLENE GLYCOL 3350 17 G/17G
POWDER, FOR SOLUTION ORAL
Qty: 238 G | Refills: 0 | Status: SHIPPED | OUTPATIENT
Start: 2025-05-30

## 2025-05-30 RX ORDER — BISACODYL 5 MG
TABLET, DELAYED RELEASE (ENTERIC COATED) ORAL
Qty: 4 TABLET | Refills: 0 | Status: SHIPPED | OUTPATIENT
Start: 2025-05-30

## 2025-05-30 NOTE — TELEPHONE ENCOUNTER
Procedure scheduled/Dr. Heller  Procedure:colonoscopy  Dx: tubular adenoma-polyp of colon-diverticusis  Date: 7/30/25  Time: 10:00 am arriving at 8:30  am  Hospital: [facility] phone call:[facility]  Bowel Prep instructions given: miralax gatorade-dulcolax  In office/via phone: phone  Clearance needed: Plavix/Aspirin-reaching out to doctors          GLP-1: none     Patient requested a lower dose prep that he could tolerate.   Adjacent Tissue Transfer Text: The defect edges were debeveled with a #15 scalpel blade.  Given the location of the defect and the proximity to free margins an adjacent tissue transfer was deemed most appropriate.  Using a sterile surgical marker, an appropriate flap was drawn incorporating the defect and placing the expected incisions within the relaxed skin tension lines where possible.    The area thus outlined was incised deep to adipose tissue with a #15 scalpel blade.  The skin margins were undermined to an appropriate distance in all directions utilizing iris scissors.

## 2025-06-03 ENCOUNTER — HOSPITAL ENCOUNTER (OUTPATIENT)
Dept: CT IMAGING | Age: 75
Discharge: HOME | End: 2025-06-05
Attending: SPECIALIST
Payer: MEDICARE

## 2025-06-03 ENCOUNTER — RESULTS FOLLOW-UP (OUTPATIENT)
Age: 75
End: 2025-06-03

## 2025-06-03 DIAGNOSIS — R31.29 MICROHEMATURIA: ICD-10-CM

## 2025-06-03 LAB
CREAT SERPL-MCNC: 1.2 MG/DL (ref 0.7–1.2)
GFR, ESTIMATED: 63 ML/MIN/1.73M2

## 2025-06-03 PROCEDURE — 2500000003 HC RX 250 WO HCPCS: Performed by: SPECIALIST

## 2025-06-03 PROCEDURE — 82565 ASSAY OF CREATININE: CPT

## 2025-06-03 PROCEDURE — 6360000004 HC RX CONTRAST MEDICATION: Performed by: SPECIALIST

## 2025-06-03 PROCEDURE — 2580000003 HC RX 258: Performed by: SPECIALIST

## 2025-06-03 PROCEDURE — 74178 CT ABD&PLV WO CNTR FLWD CNTR: CPT | Performed by: SPECIALIST

## 2025-06-03 RX ORDER — 0.9 % SODIUM CHLORIDE 0.9 %
80 INTRAVENOUS SOLUTION INTRAVENOUS ONCE
Status: COMPLETED | OUTPATIENT
Start: 2025-06-03 | End: 2025-06-03

## 2025-06-03 RX ORDER — SODIUM CHLORIDE 0.9 % (FLUSH) 0.9 %
10 SYRINGE (ML) INJECTION PRN
Status: DISCONTINUED | OUTPATIENT
Start: 2025-06-03 | End: 2025-06-06 | Stop reason: HOSPADM

## 2025-06-03 RX ORDER — IOPAMIDOL 755 MG/ML
120 INJECTION, SOLUTION INTRAVASCULAR
Status: COMPLETED | OUTPATIENT
Start: 2025-06-03 | End: 2025-06-03

## 2025-06-03 RX ADMIN — SODIUM CHLORIDE, PRESERVATIVE FREE 10 ML: 5 INJECTION INTRAVENOUS at 08:13

## 2025-06-03 RX ADMIN — SODIUM CHLORIDE 80 ML: 9 INJECTION, SOLUTION INTRAVENOUS at 08:13

## 2025-06-03 RX ADMIN — IOPAMIDOL 120 ML: 755 INJECTION, SOLUTION INTRAVENOUS at 08:13

## 2025-06-10 NOTE — TELEPHONE ENCOUNTER
Called patient to let him know we have an opening on 6/19/25 at 11am.  Patient said that will owrk well for him and scheduled for cystoscopy.

## 2025-06-19 ENCOUNTER — PROCEDURE VISIT (OUTPATIENT)
Age: 75
End: 2025-06-19
Payer: MEDICARE

## 2025-06-19 DIAGNOSIS — Z85.46 HISTORY OF PROSTATE CANCER: ICD-10-CM

## 2025-06-19 DIAGNOSIS — R35.1 NOCTURIA: ICD-10-CM

## 2025-06-19 DIAGNOSIS — R31.29 MICROHEMATURIA: Primary | ICD-10-CM

## 2025-06-19 DIAGNOSIS — N28.1 BILATERAL RENAL CYSTS: ICD-10-CM

## 2025-06-19 PROCEDURE — 1159F MED LIST DOCD IN RCRD: CPT | Performed by: SPECIALIST

## 2025-06-19 PROCEDURE — 1123F ACP DISCUSS/DSCN MKR DOCD: CPT | Performed by: SPECIALIST

## 2025-06-19 PROCEDURE — 99214 OFFICE O/P EST MOD 30 MIN: CPT | Performed by: SPECIALIST

## 2025-06-19 PROCEDURE — 81003 URINALYSIS AUTO W/O SCOPE: CPT | Performed by: SPECIALIST

## 2025-06-19 PROCEDURE — 52000 CYSTOURETHROSCOPY: CPT | Performed by: SPECIALIST

## 2025-06-19 RX ORDER — CEPHALEXIN 500 MG/1
500 CAPSULE ORAL 3 TIMES DAILY
Qty: 3 CAPSULE | Refills: 0 | Status: SHIPPED | OUTPATIENT
Start: 2025-06-19 | End: 2025-06-20

## 2025-06-19 NOTE — PROGRESS NOTES
Emory Fisher MD FACS    [facility] Urology Office Progress Note    Patient:  Ata Waller  YOB: 1950  Date: 6/19/2025    HISTORY OF PRESENT ILLNESS:   The patient is a 74 y.o. male  The patient presents with persistent trace microhematuria on UA.  Patient's 6/3/25 CT urogram shows small bilateral renal cysts of no clinical significance.  No evidence of prostate cancer recurrence s/p 8/8/18 Robotic assisted laparoscopic radical prostatectomy since PSA is 0.014.  Will repeat a PSA in 12 months.   (This is a separate and significant E/M service and discussion performed on the same day of a minor procedure justifying a -25 modifier; separate issues discussed include microhematuria, bilateral renal cysts, history of prostate cancer).    Lower urinary tract symptoms: urgency and nocturia, 1 times per night   Last AUA Symptom Score (QOL): 3 (1)  Today's AUA Symptom Score (QOL): 2 (0)    Summary of old records:   Patient's son in law is Dr. Freddy Iraheta, moved from [place], plays tennis, [place]  History of prostate cancer, s/p RALP 8/8/18 at [facility], T2 G7  ED: inactive; oral meds don't work, Bimix 0.75 mL prn  History of large abdominal hernia with mesh  Nocturia x 2  Microhematuria: 6/3/25 CT urogram-small b/l renal cysts; 6/19/25 cysto neg    Additional History: none    Procedures Today:   Cystoscopy Operative Note (6/19/25)  Surgeon: Emory Fisher MD, [facility]  Anesthesia: Urethral 2% Xylocaine   Indications: microhematuria   Position: Dorsal Lithotomy    Findings:   The patient was prepped and draped in the usual sterile fashion.  The flexible cystoscope was advanced through the urethra and into the bladder.  The bladder was thoroughly inspected and the following was noted:  Residual Urine: mild  Urethra: not indicated and normal appearing urethra with no masses, tenderness or lesions  Prostate: surgically absent prostate     Bladder: No tumors or CIS noted.  No bladder

## 2025-07-07 DIAGNOSIS — E78.2 MIXED HYPERLIPIDEMIA: ICD-10-CM

## 2025-07-07 RX ORDER — ROSUVASTATIN CALCIUM 5 MG/1
5 TABLET, COATED ORAL NIGHTLY
Qty: 100 TABLET | Refills: 3 | Status: SHIPPED | OUTPATIENT
Start: 2025-07-07

## 2025-07-24 ENCOUNTER — PATIENT MESSAGE (OUTPATIENT)
Dept: GASTROENTEROLOGY | Age: 75
End: 2025-07-24

## 2025-08-26 DIAGNOSIS — I25.119 CORONARY ARTERY DISEASE INVOLVING NATIVE CORONARY ARTERY OF NATIVE HEART WITH ANGINA PECTORIS: ICD-10-CM

## 2025-08-29 ENCOUNTER — OFFICE VISIT (OUTPATIENT)
Age: 75
End: 2025-08-29
Payer: MEDICARE

## 2025-08-29 VITALS
BODY MASS INDEX: 23.98 KG/M2 | OXYGEN SATURATION: 98 % | HEIGHT: 72 IN | SYSTOLIC BLOOD PRESSURE: 109 MMHG | HEART RATE: 62 BPM | WEIGHT: 177 LBS | RESPIRATION RATE: 18 BRPM | DIASTOLIC BLOOD PRESSURE: 61 MMHG

## 2025-08-29 DIAGNOSIS — I25.119 CORONARY ARTERY DISEASE INVOLVING NATIVE CORONARY ARTERY OF NATIVE HEART WITH ANGINA PECTORIS: Primary | ICD-10-CM

## 2025-08-29 DIAGNOSIS — Z98.61 S/P PTCA (PERCUTANEOUS TRANSLUMINAL CORONARY ANGIOPLASTY): ICD-10-CM

## 2025-08-29 DIAGNOSIS — E78.2 MIXED HYPERLIPIDEMIA: ICD-10-CM

## 2025-08-29 DIAGNOSIS — Z98.890 S/P CARDIAC CATH: ICD-10-CM

## 2025-08-29 PROCEDURE — 1159F MED LIST DOCD IN RCRD: CPT

## 2025-08-29 PROCEDURE — 99213 OFFICE O/P EST LOW 20 MIN: CPT

## 2025-08-29 PROCEDURE — 93000 ELECTROCARDIOGRAM COMPLETE: CPT

## 2025-08-29 PROCEDURE — 1123F ACP DISCUSS/DSCN MKR DOCD: CPT

## 2025-08-29 RX ORDER — ISOSORBIDE MONONITRATE 30 MG/1
30 TABLET, EXTENDED RELEASE ORAL DAILY
Qty: 30 TABLET | Refills: 3 | Status: SHIPPED | OUTPATIENT
Start: 2025-08-29

## 2025-08-29 ASSESSMENT — ENCOUNTER SYMPTOMS
SHORTNESS OF BREATH: 0
CHEST TIGHTNESS: 0

## (undated) DEVICE — GLIDESHEATH SLENDER STAINLESS STEEL KIT: Brand: GLIDESHEATH SLENDER

## (undated) DEVICE — BAND COMPR L24CM REG CLR PLAS HEMSTAT EXT HK AND LOOP RETEN

## (undated) DEVICE — RUNTHROUGH NS EXTRA FLOPPY PTCA GUIDEWIRE: Brand: RUNTHROUGH

## (undated) DEVICE — Device: Brand: EAGLE EYE PLATINUM RX DIGITAL IVUS CATHETER

## (undated) DEVICE — CATHETER LA6EBU355 LA 6F 100CM 5PK EB35

## (undated) DEVICE — CATH BLLN ANGIO 2.50X20MM SC EUPHORA RX

## (undated) DEVICE — CATHETER ANGIO 6FR L100CM DIA0.056IN FR4 CRV VASC ACCS EXPO

## (undated) DEVICE — SURGICAL PROCEDURE TRAY CRD CATH SVMMC

## (undated) DEVICE — ANGIOGRAPHIC CATHETER: Brand: EXPO™

## (undated) DEVICE — STENT CORONARY ONYX FRONTIER RX 3.5X22 MM ZOTAROLIMUS ELUT: Type: IMPLANTABLE DEVICE | Status: NON-FUNCTIONAL

## (undated) DEVICE — STRAP ARMBRD W1.5XL32IN FOAM STR YET SFT W/ HK AND LOOP